# Patient Record
Sex: MALE | Race: OTHER | HISPANIC OR LATINO | Employment: FULL TIME | URBAN - METROPOLITAN AREA
[De-identification: names, ages, dates, MRNs, and addresses within clinical notes are randomized per-mention and may not be internally consistent; named-entity substitution may affect disease eponyms.]

---

## 2019-06-25 ENCOUNTER — OFFICE VISIT (OUTPATIENT)
Dept: URGENT CARE | Facility: CLINIC | Age: 44
End: 2019-06-25
Payer: COMMERCIAL

## 2019-06-25 VITALS
HEART RATE: 89 BPM | OXYGEN SATURATION: 99 % | BODY MASS INDEX: 29.49 KG/M2 | RESPIRATION RATE: 16 BRPM | HEIGHT: 70 IN | SYSTOLIC BLOOD PRESSURE: 136 MMHG | WEIGHT: 206 LBS | DIASTOLIC BLOOD PRESSURE: 80 MMHG | TEMPERATURE: 97.8 F

## 2019-06-25 DIAGNOSIS — M54.32 LEFT SIDED SCIATICA: ICD-10-CM

## 2019-06-25 PROCEDURE — 99203 OFFICE O/P NEW LOW 30 MIN: CPT | Performed by: NURSE PRACTITIONER

## 2019-06-25 RX ORDER — IBUPROFEN 600 MG/1
600 TABLET ORAL EVERY 6 HOURS PRN
COMMUNITY
End: 2024-03-17

## 2019-06-25 RX ORDER — CYCLOBENZAPRINE HCL 5 MG
5-10 TABLET ORAL 3 TIMES DAILY PRN
Qty: 30 TAB | Refills: 0 | Status: SHIPPED | OUTPATIENT
Start: 2019-06-25 | End: 2020-10-14

## 2019-06-25 RX ORDER — KETOROLAC TROMETHAMINE 30 MG/ML
30 INJECTION, SOLUTION INTRAMUSCULAR; INTRAVENOUS ONCE
Status: COMPLETED | OUTPATIENT
Start: 2019-06-25 | End: 2019-06-25

## 2019-06-25 RX ADMIN — KETOROLAC TROMETHAMINE 30 MG: 30 INJECTION, SOLUTION INTRAMUSCULAR; INTRAVENOUS at 11:20

## 2019-06-25 ASSESSMENT — ENCOUNTER SYMPTOMS
BACK PAIN: 1
VOMITING: 0
COUGH: 0
SENSORY CHANGE: 0
WEAKNESS: 0
FOCAL WEAKNESS: 0
TINGLING: 0
CHILLS: 0
ORTHOPNEA: 0
SHORTNESS OF BREATH: 0
FEVER: 0
NAUSEA: 0

## 2019-06-25 NOTE — PROGRESS NOTES
"Subjective:      Teja Michel is a 44 y.o. male who presents with Back Pain (x4 days sciatica nerve pain, (R) side)            HPI New. 44 year old male with left sided sciatica for 4 days. States pain in moderate. Denies bowel or bladder issues, nausea, abdominal pain or pelvic paresthesia. He reports that this pain radiates down to his knee. Not a new issue for him. Has been taking ibuprofen sporadically.  Aspirin  No current outpatient prescriptions on file prior to visit.     No current facility-administered medications on file prior to visit.      Social History     Social History   • Marital status: Single     Spouse name: N/A   • Number of children: N/A   • Years of education: N/A     Occupational History   • Not on file.     Social History Main Topics   • Smoking status: Never Smoker   • Smokeless tobacco: Never Used   • Alcohol use No   • Drug use: No   • Sexual activity: Not on file     Other Topics Concern   • Not on file     Social History Narrative   • No narrative on file     family history is not on file.      Review of Systems   Constitutional: Negative for chills and fever.   Respiratory: Negative for cough and shortness of breath.    Cardiovascular: Negative for chest pain and orthopnea.   Gastrointestinal: Negative for nausea and vomiting.   Genitourinary: Negative for dysuria.   Musculoskeletal: Positive for back pain.   Neurological: Negative for tingling, sensory change, focal weakness and weakness.          Objective:     /80   Pulse 89   Temp 36.6 °C (97.8 °F) (Temporal)   Resp 16   Ht 1.778 m (5' 10\")   Wt 93.4 kg (206 lb)   SpO2 99%   BMI 29.56 kg/m²      Physical Exam   Constitutional: He appears well-developed and well-nourished. No distress.   Cardiovascular: Normal rate, regular rhythm and normal heart sounds.    No murmur heard.  Pulmonary/Chest: Effort normal and breath sounds normal.   Musculoskeletal:        Lumbar back: He exhibits decreased range of motion, " tenderness, pain and spasm. He exhibits no swelling.   Neurological: He is alert. He exhibits normal muscle tone. Gait normal.   Reflex Scores:       Patellar reflexes are 2+ on the right side and 2+ on the left side.              Assessment/Plan:     1. Left sided sciatica  cyclobenzaprine (FLEXERIL) 5 MG tablet    REFERRAL TO PHYSIATRY (PMR)    ketorolac (TORADOL) injection 30 mg     toradol here in clinic.  Ibuprofen every 8 hours with food.  Flexeril with sedation precautions given.  Gentle stretching.  Differential diagnosis, natural history, supportive care, and indications for immediate follow-up discussed at length.

## 2020-09-11 ENCOUNTER — OFFICE VISIT (OUTPATIENT)
Dept: URGENT CARE | Facility: CLINIC | Age: 45
End: 2020-09-11
Payer: COMMERCIAL

## 2020-09-11 VITALS
WEIGHT: 234 LBS | HEART RATE: 90 BPM | OXYGEN SATURATION: 95 % | SYSTOLIC BLOOD PRESSURE: 124 MMHG | RESPIRATION RATE: 14 BRPM | TEMPERATURE: 98.2 F | DIASTOLIC BLOOD PRESSURE: 76 MMHG | BODY MASS INDEX: 33.5 KG/M2 | HEIGHT: 70 IN

## 2020-09-11 DIAGNOSIS — L03.032 PARONYCHIA OF GREAT TOE OF LEFT FOOT: ICD-10-CM

## 2020-09-11 PROCEDURE — 99214 OFFICE O/P EST MOD 30 MIN: CPT | Performed by: FAMILY MEDICINE

## 2020-09-11 RX ORDER — SULFAMETHOXAZOLE AND TRIMETHOPRIM 800; 160 MG/1; MG/1
1 TABLET ORAL 2 TIMES DAILY
Qty: 14 TAB | Refills: 0 | Status: SHIPPED | OUTPATIENT
Start: 2020-09-11 | End: 2020-09-18

## 2020-09-11 ASSESSMENT — ENCOUNTER SYMPTOMS
SHORTNESS OF BREATH: 0
FEVER: 0
FOCAL WEAKNESS: 0
SORE THROAT: 0
TINGLING: 1
COUGH: 0
VOMITING: 0

## 2020-09-11 NOTE — PROGRESS NOTES
"Subjective:     Teja Michel is a 45 y.o. male who presents for Foot Problem ( (L) big toe pain )    HPI  Pt presents for evaluation of a new problem   Pt with pain in left great toe   Pt with left great toe pain on and off for the past few weeks  Has had swelling and redness around the toenail  Sometimes painful to walk  Exacerbated by tight shoes  Has not had any drainage  Has not tried any treatment other than trying to stay off his feet    Review of Systems   Constitutional: Negative for fever.   HENT: Negative for sore throat.    Respiratory: Negative for cough and shortness of breath.    Cardiovascular: Negative for chest pain.   Gastrointestinal: Negative for vomiting.   Skin: Positive for rash.   Neurological: Positive for tingling (Chronic ). Negative for focal weakness.     PMH: History of left-sided sciatica causing chronic numbness in left foot  MEDS:   Current Outpatient Medications:   •  ibuprofen (MOTRIN) 600 MG Tab, Take 600 mg by mouth every 6 hours as needed., Disp: , Rfl:   •  cyclobenzaprine (FLEXERIL) 5 MG tablet, Take 1-2 Tabs by mouth 3 times a day as needed for Moderate Pain., Disp: 30 Tab, Rfl: 0  ALLERGIES:   Allergies   Allergen Reactions   • Aspirin      Facial swelling     SURGHX: History reviewed. No pertinent surgical history.  SOCHX:  reports that he has never smoked. He has never used smokeless tobacco. He reports that he does not drink alcohol or use drugs.  FH: Family history was reviewed, not contributing to acute infection     Objective:   /76 (BP Location: Left arm, Patient Position: Sitting, BP Cuff Size: Adult)   Pulse 90   Temp 36.8 °C (98.2 °F) (Temporal)   Resp 14   Ht 1.778 m (5' 10\")   Wt 106.1 kg (234 lb)   SpO2 95%   BMI 33.58 kg/m²     Physical Exam  Constitutional:       General: He is not in acute distress.     Appearance: He is well-developed. He is not diaphoretic.   HENT:      Head: Normocephalic and atraumatic.   Pulmonary:      Effort: " Pulmonary effort is normal.   Musculoskeletal:      Comments: Full range of motion of left great toe   Skin:     General: Skin is warm and dry.      Comments: Mild erythema and swelling at the base of left great toe nail, no drainable abscess appreciated, no active drainage   Neurological:      Mental Status: He is alert and oriented to person, place, and time.   Psychiatric:         Behavior: Behavior normal.         Thought Content: Thought content normal.         Judgment: Judgment normal.       Assessment/Plan:   Assessment    1. Paronychia of great toe of left foot  - sulfamethoxazole-trimethoprim (BACTRIM DS) 800-160 MG tablet; Take 1 Tab by mouth 2 times a day for 7 days.  Dispense: 14 Tab; Refill: 0    Patient with paronychia.  Based on his pictures from his phone, has actually improved a little over the past 1 week.  Will treat with course of antibiotics and follow-up if not fully resolved at that time.  Retains near normal sensation, and degree of tingling/sensory loss he is stated to be chronic, per patient.  No further work-up or follow-up needed as long as antibiotics fully resolving his symptoms.

## 2020-10-02 ENCOUNTER — NURSE TRIAGE (OUTPATIENT)
Dept: HEALTH INFORMATION MANAGEMENT | Facility: OTHER | Age: 45
End: 2020-10-02

## 2020-10-02 NOTE — TELEPHONE ENCOUNTER
"Teja.    Left foot with numbness.  Patient has been seen by [facility] and was told he had an infection and ingrown toenail.  Patient was given antibiotics.  Foot is progressively getting number and more dry and feels like \"leather\".  He is also stating both feet are really sore when he gets out of bed or stands from sitting.    PAIN AND NUMBNESS IS PROGRESSING IN FEET BILATERALLY.   Reason for Disposition  • Numbness or tingling on both sides of body and is a new symptom lasting > 24 hours    Additional Information  • Negative: Difficult to awaken or acting confused (e.g., disoriented, slurred speech)  • Negative: New neurologic deficit that is present NOW, sudden onset of ANY of the following: * Weakness of the face, arm, or leg on one side of the body * Numbness of the face, arm, or leg on one side of the body * Loss of speech or garbled speech  • Negative: Sounds like a life-threatening emergency to the triager  • Negative: Confusion, disorientation, or hallucinations is the main symptom  • Negative: Dizziness is the main symptom  • Negative: Followed a head injury within last 3 days  • Negative: Headache (with neurologic deficit)  • Negative: Unable to urinate (or only a few drops) and bladder feels very full  • Negative: Loss of control of bowel or bladder (i.e., incontinence) of new onset  • Negative: Back pain with numbness (loss of sensation) in groin or rectal area  • Negative: Patient sounds very sick or weak to the triager  • Negative: Neurologic deficit that was brief (now gone), ANY of the following: * Weakness of the face, arm, or leg on one side of the body * Numbness of the face, arm, or leg on one side of the body * Loss of speech or garbled speech  • Negative: Neurologic deficit of gradual onset, ANY of the following: * Weakness of the face, arm, or leg on one side of the body * Numbness of the face, arm, or leg on one side of the body * Loss of speech or garbled speech  • Negative: [place] " "palsy suspected (i.e., weakness only one side of the face, developing over hours to days, no other symptoms)  • Negative: Tingling (e.g., pins and needles) of the face, arm or leg on one side of the body, that is  present now  • Negative: Patient wants to be seen  • Negative: Back pain (with neurologic deficit)  • Negative: Neck pain (with neurologic deficit)  • Negative: Loss of speech or garbled speech is a chronic symptom (recurrent or ongoing problem lasting > 4 weeks)  • Negative: Weakness of arm or leg is a chronic symptom (recurrent or ongoing problem lasting > 4 weeks)  • Negative: Numbness or tingling in one or both hands is a chronic symptom (recurrent or ongoing problem lasting > 4 weeks)  • Negative: Numbness or tingling in one or both feet is a chronic symptom (recurrent or ongoing problem lasting > 4 weeks)    Answer Assessment - Initial Assessment Questions  1. SYMPTOM: \"What is the main symptom you are concerned about?\" (e.g., weakness, numbness)      Numbness in left foot and pain in bilateral feet  2. ONSET: \"When did this start?\" (minutes, hours, days; while sleeping)      Over a year ago with nerve damage on LEFT foot and great toe  3. LAST NORMAL: \"When was the last time you were normal (no symptoms)?\"      Over a year ago  4. PATTERN \"Does this come and go, or has it been constant since it started?\"  \"Is it present now?\"      Constant and progressing  5. CARDIAC SYMPTOMS: \"Have you had any of the following symptoms: chest pain, difficulty breathing, palpitations?\"      no  6. NEUROLOGIC SYMPTOMS: \"Have you had any of the following symptoms: headache, dizziness, vision loss, double vision, changes in speech, unsteady on your feet?\"  no  7. OTHER SYMPTOMS: \"Do you have any other symptoms?\"      no  8. PREGNANCY: \"Is there any chance you are pregnant?\" \"When was your last menstrual period?\"      no    Protocols used: NEUROLOGIC DEFICIT-A-[place]      "

## 2020-10-14 ENCOUNTER — OFFICE VISIT (OUTPATIENT)
Dept: MEDICAL GROUP | Facility: MEDICAL CENTER | Age: 45
End: 2020-10-14
Payer: COMMERCIAL

## 2020-10-14 VITALS
BODY MASS INDEX: 34.15 KG/M2 | WEIGHT: 238.54 LBS | TEMPERATURE: 97.8 F | OXYGEN SATURATION: 94 % | SYSTOLIC BLOOD PRESSURE: 140 MMHG | HEART RATE: 94 BPM | DIASTOLIC BLOOD PRESSURE: 88 MMHG | HEIGHT: 70 IN

## 2020-10-14 DIAGNOSIS — Z87.11 HISTORY OF PEPTIC ULCER: ICD-10-CM

## 2020-10-14 DIAGNOSIS — R13.10 DYSPHAGIA, UNSPECIFIED TYPE: ICD-10-CM

## 2020-10-14 DIAGNOSIS — E78.5 DYSLIPIDEMIA: ICD-10-CM

## 2020-10-14 DIAGNOSIS — Z76.89 ENCOUNTER TO ESTABLISH CARE: ICD-10-CM

## 2020-10-14 DIAGNOSIS — R74.01 TRANSAMINITIS: ICD-10-CM

## 2020-10-14 DIAGNOSIS — R20.0 NUMBNESS: ICD-10-CM

## 2020-10-14 DIAGNOSIS — R03.0 BLOOD PRESSURE ELEVATED WITHOUT HISTORY OF HTN: ICD-10-CM

## 2020-10-14 DIAGNOSIS — K21.9 GASTROESOPHAGEAL REFLUX DISEASE, UNSPECIFIED WHETHER ESOPHAGITIS PRESENT: ICD-10-CM

## 2020-10-14 DIAGNOSIS — E66.9 OBESITY (BMI 30-39.9): ICD-10-CM

## 2020-10-14 PROCEDURE — 99214 OFFICE O/P EST MOD 30 MIN: CPT | Performed by: NURSE PRACTITIONER

## 2020-10-14 ASSESSMENT — PATIENT HEALTH QUESTIONNAIRE - PHQ9: CLINICAL INTERPRETATION OF PHQ2 SCORE: 0

## 2020-10-14 NOTE — PROGRESS NOTES
CC: Establish care/numbness in feet, history of elevated liver enzymes      Teja Michel is a 45 y.o. male here to establish care and to discuss the evaluation and management of:    Patient here today to establish care and discuss numbness in feet, history of elevated enzymes    Former PCP in [place] -has not followed up in over three years      Left foot numbness  Intermittent sensation of numbness on the bottom of his foot.  This is primary in his heel.  States it feels like a thick layer of numbness sometimes.  Has noticed some tingling and pinching sensations however it is intermittent in nature.  Does mention he has had change of footwear.  Noticed an improvement when he returned to previous footwear.  I discussed is likely that the shoes causing his symptoms that they may be too tight.  Recommend getting wide shoes.       History of elevated Liver enzymes  States he was told he has elevated liver enzymes however he has never followed up with this.  Very vague about his previous medical history.      Cholesterol   History of elevated cholesterol. Not so interested in statins. Was on them in the past however did not want to continue taking them.    Dysphagia  Has a hard time swallowing pills. Has had this for a long time.  States he has to have everything in liquid form.     GERD  Chronic. Not a daily problem. Not taking anything daily for this. Takes Vinegar for this as needed and states it is helpful. Remote history of having a ulcer.         ROS:  Denies any Headache, Blurred Vision, Confusion, Chest pain,  Shortness of breath,  Abdominal pain, Changes of bowel or bladder, Hematuria, Hematochezia, Lower ext. edema, Fevers, Nights sweats, Weight Changes, Focal weakness or numbness.  And all other systems reviewed and all are negative. +numbness on bottom of foot, GERD      Current Outpatient Medications:   •  ibuprofen (MOTRIN) 600 MG Tab, Take 600 mg by mouth every 6 hours as needed., Disp: ,  Rfl:     Allergies   Allergen Reactions   • Aspirin      Facial swelling       Past Medical History:   Diagnosis Date   • GERD (gastroesophageal reflux disease)    • Hyperlipidemia      Past Surgical History:   Procedure Laterality Date   • APPENDECTOMY     • HERNIA REPAIR Right     inguinal     Family History   Problem Relation Age of Onset   • Thyroid Mother    • Hyperlipidemia Father    • Heart Attack Father    • Heart Disease Father    • Hypertension Father    • No Known Problems Sister    • Diabetes Neg Hx      Social History     Socioeconomic History   • Marital status: Single     Spouse name: Not on file   • Number of children: Not on file   • Years of education: Not on file   • Highest education level: Not on file   Occupational History   • Not on file   Social Needs   • Financial resource strain: Not on file   • Food insecurity     Worry: Not on file     Inability: Not on file   • Transportation needs     Medical: Not on file     Non-medical: Not on file   Tobacco Use   • Smoking status: Former Smoker     Quit date: 10/14/2010     Years since quitting: 10.0   • Smokeless tobacco: Never Used   Substance and Sexual Activity   • Alcohol use: No   • Drug use: No   • Sexual activity: Not Currently     Partners: Female   Lifestyle   • Physical activity     Days per week: Not on file     Minutes per session: Not on file   • Stress: Not on file   Relationships   • Social connections     Talks on phone: Not on file     Gets together: Not on file     Attends Yarsanism service: Not on file     Active member of club or organization: Not on file     Attends meetings of clubs or organizations: Not on file     Relationship status: Not on file   • Intimate partner violence     Fear of current or ex partner: Not on file     Emotionally abused: Not on file     Physically abused: Not on file     Forced sexual activity: Not on file   Other Topics Concern   • Not on file   Social History Narrative   • Not on file  "      Objective:     Vitals: /88 (BP Location: Right arm, Patient Position: Sitting, BP Cuff Size: Adult)   Pulse 94   Temp 36.6 °C (97.8 °F) (Temporal)   Ht 1.778 m (5' 10\") Comment: Pt reported,didn't want to take his shoes off to take height  Wt 108.2 kg (238 lb 8.6 oz)   SpO2 94%   BMI 34.23 kg/m²      General: Alert, pleasant, NAD  HEENT:  Normocephalic.  Neck supple.  No thyromegaly or masses palpated. No cervical or supraclavicular lymphadenopathy.  Heart:  Regular rate and rhythm.  S1 and S2 normal.  No murmurs appreciated.    Respiratory:  Normal respiratory effort.  Clear to auscultation bilaterally.    Skin:  Warm, dry, no rashes  Musculoskeletal:  Gait is normal.  Moves all extremities well.  Extremities:   No leg edema.  Neurological: No tremors  Psych:  Affect/mood is normal, judgement is good, memory is intact, grooming is appropriate.      Assessment and Plan.     45 y.o. male to establish care and discuss the followin. Blood pressure elevated without history of HTN  New to me.  140/88 in clinic.  No chest pain, shortness of breath or dizziness.  Has gained weight over the last year.  Recommend weight loss, DASH diet, exercise.  Follow-up labs.  Have discussed starting medication for this.  - Comp Metabolic Panel; Future  - MICROALBUMIN CREAT RATIO URINE; Future  - TSH; Future    2. Dyslipidemia  New to me.  Have ordered labs.  - Lipid Profile; Future    3. Gastroesophageal reflux disease, unspecified whether esophagitis present  New problem to me.  States his symptoms are intermittent and improved with drinking vinegar.  Recommend follow-up with GI.  Dietary modifications to avoid trigger foods, weight loss.  Recommend as needed Tums or Pepcid if symptoms worsen.    4. History of peptic ulcer  Remote history.  Denies any significant abdominal pain, epigastric discomfort, blood or black tarry stools or hematemesis at this time.  Recommend avoiding NSAIDs.      5. Dysphagia, " unspecified type  New problem to me.  Reports long history of having difficulty swallowing pills.  Recommend follow-up with gastroenterology.    6. Obesity (BMI 30-39.9)  Chronic. Patient encouraged to reduce excess calorie consumption. Encouraged regular exercise. Discussed long term sequelae of obesity.  - Patient identified as having weight management issue.  Appropriate orders and counseling given.    7. Numbness  New problem.  Suspect it is due to improper fitting footwear based on patient's work.  Recommend wide fitting shoes, weight loss.  - VITAMIN B12; Future    8. Transaminitis  Reports having elevated liver enzymes in the past.  Labs ordered.  - CBC WITHOUT DIFFERENTIAL; Future  - Comp Metabolic Panel; Future  - GAMMA GT (GGT); Future  - HEPATITIS PANEL ACUTE(4 COMPONENTS); Future    9. Encounter to establish care        Return for Long (40 min), Lab results.          Tari NATARAJAN.

## 2020-10-20 LAB
ALBUMIN SERPL-MCNC: 4.9 G/DL (ref 4–5)
ALBUMIN/CREAT UR: 32 MG/G CREAT (ref 0–29)
ALBUMIN/GLOB SERPL: 1.6 {RATIO} (ref 1.2–2.2)
ALP SERPL-CCNC: 95 IU/L (ref 39–117)
ALT SERPL-CCNC: 195 IU/L (ref 0–44)
AST SERPL-CCNC: 85 IU/L (ref 0–40)
BASOPHILS # BLD AUTO: 0.1 X10E3/UL (ref 0–0.2)
BASOPHILS NFR BLD AUTO: 1 %
BILIRUB SERPL-MCNC: 0.7 MG/DL (ref 0–1.2)
BUN SERPL-MCNC: 16 MG/DL (ref 6–24)
BUN/CREAT SERPL: 14 (ref 9–20)
CALCIUM SERPL-MCNC: 10.2 MG/DL (ref 8.7–10.2)
CHLORIDE SERPL-SCNC: 99 MMOL/L (ref 96–106)
CHOLEST SERPL-MCNC: 223 MG/DL (ref 100–199)
CO2 SERPL-SCNC: 21 MMOL/L (ref 20–29)
CREAT SERPL-MCNC: 1.12 MG/DL (ref 0.76–1.27)
CREAT UR-MCNC: 252.4 MG/DL
EOSINOPHIL # BLD AUTO: 0.1 X10E3/UL (ref 0–0.4)
EOSINOPHIL NFR BLD AUTO: 2 %
ERYTHROCYTE [DISTWIDTH] IN BLOOD BY AUTOMATED COUNT: 12.6 % (ref 11.6–15.4)
GGT SERPL-CCNC: 72 IU/L (ref 0–65)
GLOBULIN SER CALC-MCNC: 3 G/DL (ref 1.5–4.5)
GLUCOSE SERPL-MCNC: 111 MG/DL (ref 65–99)
HAV IGM SERPL QL IA: NEGATIVE
HBV CORE IGM SERPL QL IA: NEGATIVE
HBV SURFACE AG SERPL QL IA: NEGATIVE
HCT VFR BLD AUTO: 52.9 % (ref 37.5–51)
HCV AB S/CO SERPL IA: <0.1 S/CO RATIO (ref 0–0.9)
HDLC SERPL-MCNC: 38 MG/DL
HGB BLD-MCNC: 17.9 G/DL (ref 13–17.7)
IMM GRANULOCYTES # BLD AUTO: 0.1 X10E3/UL (ref 0–0.1)
IMM GRANULOCYTES NFR BLD AUTO: 1 %
IMMATURE CELLS  115398: ABNORMAL
LABORATORY COMMENT REPORT: ABNORMAL
LDLC SERPL CALC-MCNC: 115 MG/DL (ref 0–99)
LYMPHOCYTES # BLD AUTO: 2.6 X10E3/UL (ref 0.7–3.1)
LYMPHOCYTES NFR BLD AUTO: 28 %
MCH RBC QN AUTO: 30.7 PG (ref 26.6–33)
MCHC RBC AUTO-ENTMCNC: 33.8 G/DL (ref 31.5–35.7)
MCV RBC AUTO: 91 FL (ref 79–97)
MICROALBUMIN UR-MCNC: 81.8 UG/ML
MONOCYTES # BLD AUTO: 1 X10E3/UL (ref 0.1–0.9)
MONOCYTES NFR BLD AUTO: 11 %
MORPHOLOGY BLD-IMP: ABNORMAL
NEUTROPHILS # BLD AUTO: 5.3 X10E3/UL (ref 1.4–7)
NEUTROPHILS NFR BLD AUTO: 57 %
NRBC BLD AUTO-RTO: ABNORMAL %
PLATELET # BLD AUTO: 243 X10E3/UL (ref 150–450)
POTASSIUM SERPL-SCNC: 4.6 MMOL/L (ref 3.5–5.2)
PROT SERPL-MCNC: 7.9 G/DL (ref 6–8.5)
RBC # BLD AUTO: 5.84 X10E6/UL (ref 4.14–5.8)
SODIUM SERPL-SCNC: 138 MMOL/L (ref 134–144)
TRIGL SERPL-MCNC: 399 MG/DL (ref 0–149)
TSH SERPL DL<=0.005 MIU/L-ACNC: 3.74 UIU/ML (ref 0.45–4.5)
VIT B12 SERPL-MCNC: 849 PG/ML (ref 232–1245)
VLDLC SERPL CALC-MCNC: 70 MG/DL (ref 5–40)
WBC # BLD AUTO: 9.1 X10E3/UL (ref 3.4–10.8)

## 2020-10-28 ENCOUNTER — OFFICE VISIT (OUTPATIENT)
Dept: MEDICAL GROUP | Facility: MEDICAL CENTER | Age: 45
End: 2020-10-28
Payer: COMMERCIAL

## 2020-10-28 VITALS
DIASTOLIC BLOOD PRESSURE: 78 MMHG | OXYGEN SATURATION: 95 % | HEART RATE: 90 BPM | BODY MASS INDEX: 34.07 KG/M2 | TEMPERATURE: 97.7 F | HEIGHT: 70 IN | SYSTOLIC BLOOD PRESSURE: 134 MMHG | WEIGHT: 238 LBS

## 2020-10-28 DIAGNOSIS — R80.9 MICROALBUMINURIA: ICD-10-CM

## 2020-10-28 DIAGNOSIS — R73.01 IFG (IMPAIRED FASTING GLUCOSE): ICD-10-CM

## 2020-10-28 DIAGNOSIS — R74.01 TRANSAMINITIS: ICD-10-CM

## 2020-10-28 DIAGNOSIS — L60.0 INGROWN NAIL: ICD-10-CM

## 2020-10-28 DIAGNOSIS — M79.672 PAIN OF LEFT HEEL: ICD-10-CM

## 2020-10-28 DIAGNOSIS — E78.5 DYSLIPIDEMIA: ICD-10-CM

## 2020-10-28 LAB
HBA1C MFR BLD: 5.6 % (ref 0–5.6)
INT CON NEG: NEGATIVE
INT CON POS: POSITIVE

## 2020-10-28 PROCEDURE — 83036 HEMOGLOBIN GLYCOSYLATED A1C: CPT | Performed by: NURSE PRACTITIONER

## 2020-10-28 PROCEDURE — 99214 OFFICE O/P EST MOD 30 MIN: CPT | Performed by: NURSE PRACTITIONER

## 2020-10-28 ASSESSMENT — FIBROSIS 4 INDEX: FIB4 SCORE: 1.15

## 2020-10-28 NOTE — PROGRESS NOTES
Chief Complaint   Patient presents with   • Lab Results     DOS: 10/18/2020       Subjective:     HPI:     Teja Michel is a 45 y.o. male here to discuss the evaluation and management of:    Presents today to review his most recent labs.    1. Dyslipidemia  Have reviewed most recent labs with patient.  Continues to decline statin therapy despite his results.  Does endorse a poor diet.  Sedentary.    10/19/2020   Ref. Range 10/19/2020 05:31   Cholesterol,Tot Latest Ref Range: 100 - 199 mg/dL 223 (H)   Triglycerides Latest Ref Range: 0 - 149 mg/dL 399 (H)   HDL Latest Ref Range: >39 mg/dL 38 (L)   LDL Chol Calc (NIH) Latest Ref Range: 0 - 99 mg/dL 115 (H)   VLDL Cholesterol Calc Latest Ref Range: 5 - 40 mg/dL 70 (H)     2. Transaminitis  Most recent lab work with elevation.  Denies excessive alcohol consumption.  Over-the-counter medications that he has been taking would be Turmeric on and off however states that he has had a history of elevated liver enzymes prior to even starting turmeric.     10/19/2020   Ref. Range 10/19/2020 05:31   AST(SGOT) Latest Ref Range: 0 - 40 IU/L 85 (H)   ALT(SGPT) Latest Ref Range: 0 - 44 IU/L 195 (H)      Ref. Range 10/19/2020 05:31   Gamma Gt Latest Ref Range: 0 - 65 IU/L 72 (H)         3. IFG (impaired fasting glucose)  Found on previous labs.  Have checked A1c in the clinic today 5.6%.  Have discussed this with the patient in detail.  Recommend weight loss, dietary management and exercise.    4. Microalbuminuria  Present on most recent lab work.  Have discussed this with the patient.    5. Ingrown nail  Left great toe with slight hyperemia around his nail fold, sensitivity.  No purulence or fluctuance or presence of infection at this time however has had previous infection that he did not seek medical attention for.    6. Pain of left heel  Continues to complain of pain in his heel.  There is no obvious signs of deformity, injury, infection.  Reports having history of  sciatica problems however previously stated that he did have some improvement with changing of his footwear.  Does work 13-hour shifts on his feet.      ROS:  Denies any Headache, Blurred Vision, Confusion, Chest pain,  Shortness of breath,  Abdominal pain, Changes of bowel or bladder, Lower ext edema, Fevers, Nights sweats, Weight Changes, Focal weakness or numbness.  And all other systems reviewed and are all negative. POSITIVE FOR heel pain, ingrown nail        Current Outpatient Medications:   •  ibuprofen (MOTRIN) 600 MG Tab, Take 600 mg by mouth every 6 hours as needed., Disp: , Rfl:     Allergies   Allergen Reactions   • Aspirin      Facial swelling       Past Medical History:   Diagnosis Date   • GERD (gastroesophageal reflux disease)    • Hyperlipidemia      Past Surgical History:   Procedure Laterality Date   • APPENDECTOMY     • HERNIA REPAIR Right     inguinal     Family History   Problem Relation Age of Onset   • Thyroid Mother    • Hyperlipidemia Father    • Heart Attack Father    • Heart Disease Father    • Hypertension Father    • No Known Problems Sister    • Diabetes Neg Hx      Social History     Socioeconomic History   • Marital status: Single     Spouse name: Not on file   • Number of children: Not on file   • Years of education: Not on file   • Highest education level: Not on file   Occupational History   • Not on file   Social Needs   • Financial resource strain: Not on file   • Food insecurity     Worry: Not on file     Inability: Not on file   • Transportation needs     Medical: Not on file     Non-medical: Not on file   Tobacco Use   • Smoking status: Former Smoker     Quit date: 10/14/2010     Years since quitting: 10.0   • Smokeless tobacco: Never Used   Substance and Sexual Activity   • Alcohol use: No   • Drug use: No   • Sexual activity: Not Currently     Partners: Female   Lifestyle   • Physical activity     Days per week: Not on file     Minutes per session: Not on file   • Stress:  "Not on file   Relationships   • Social connections     Talks on phone: Not on file     Gets together: Not on file     Attends Ep[employer] service: Not on file     Active member of club or organization: Not on file     Attends meetings of clubs or organizations: Not on file     Relationship status: Not on file   • Intimate partner violence     Fear of current or ex partner: Not on file     Emotionally abused: Not on file     Physically abused: Not on file     Forced sexual activity: Not on file   Other Topics Concern   • Not on file   Social History Narrative   • Not on file       Objective:     Vitals: /78 (BP Location: Right arm, Patient Position: Sitting, BP Cuff Size: Adult)   Pulse 90   Temp 36.5 °C (97.7 °F) (Temporal)   Ht 1.778 m (5' 10\") Comment: Pt reported  Wt 108 kg (238 lb)   SpO2 95%   BMI 34.15 kg/m²    General: Alert, pleasant, NAD  HEENT: Normocephalic.    Skin: Warm, dry, no rashes.  Right great toenail with erythema around nailbed.  Extremities: No leg edema. No discoloration  Neurological: No tremors  Psych:  Affect/mood is normal, judgement is good, memory is intact, grooming is appropriate.    Assessment/Plan:     Teja was seen today for lab results.    Diagnoses and all orders for this visit:    Dyslipidemia  Not well controlled.  Continues to decline statins.  Recommend aggressive dietary changes, exercise and weight loss.  Patient states he would like to try this before any further aggressive treatment such as medications.  Repeat in 4 to 6 months.  -     Lipid Profile; Future    Transaminitis  Chronic prone for the patient.  Most recent labs with elevation.  Has not had ultrasound.  Referral to gastroenterology for further work-up.  -     REFERRAL TO GASTROENTEROLOGY    IFG (impaired fasting glucose)  A1c in clinic today 5.6%.  Have cautioned patient regarding prediabetes.  Continue to monitor.  -     POCT  A1C  -     HEMOGLOBIN A1C; Future    Microalbuminuria  Present in " the clinic today.  Continue to routinely monitor.  Work on weight loss, Dash diet.  A1c 5.6%.    Ingrown nail  -     REFERRAL TO PODIATRY    Pain of left heel  Persistent discomfort.  Had slight improvement with change of footwear.  Referral placed to podiatry for further recommendations and work-up.  Have discussed making sure he has proper footwear-especially because he does work long shifts on his feet, weight loss would be helpful.  -     REFERRAL TO PODIATRY      No follow-ups on file.    {I have placed the above orders and discussed them with an approved delegating provider.  The MA is performing the below orders under the direction of Dr. Skyler STEWART

## 2020-10-28 NOTE — PATIENT INSTRUCTIONS
High Triglycerides Eating Plan  Triglycerides are a type of fat in the blood. High levels of triglycerides can increase your risk of heart disease and stroke. If your triglyceride levels are high, choosing the right foods can help lower your triglycerides and keep your heart healthy. Work with your health care provider or a diet and nutrition specialist (dietitian) to develop an eating plan that is right for you.  What are tips for following this plan?  General guidelines    · Lose weight, if you are overweight. For most people, losing 5-10 lbs (2-5 kg) helps lower triglyceride levels. A weight-loss plan may include.  ? 30 minutes of exercise at least 5 days a week.  ? Reducing the amount of calories, sugar, and fat you eat.  · Eat a wide variety of fresh fruits, vegetables, and whole grains. These foods are high in fiber.  · Eat foods that contain healthy fats, such as fatty fish, nuts, seeds, and olive oil.  · Avoid foods that are high in added sugar, added salt (sodium), saturated fat, and trans fat.  · Avoid low-fiber, refined carbohydrates such as white bread, crackers, noodles, and white rice.  · Avoid foods with partially hydrogenated oils (trans fats), such as fried foods or stick margarine.  · Limit alcohol intake to no more than 1 drink a day for nonpregnant women and 2 drinks a day for men. One drink equals 12 oz of beer, 5 oz of wine, or 1½ oz of hard liquor. Your health care provider may recommend that you drink less depending on your overall health.  Reading food labels  · Check food labels for the amount of saturated fat. Choose foods with no or very little saturated fat.  · Check food labels for the amount of trans fat. Choose foods with no trans fat.  · Check food labels for the amount of cholesterol. Choose foods low in cholesterol. Ask your dietitian how much cholesterol you should have each day.  · Check food labels for the amount of sodium. Choose foods with less than 140 milligrams (mg) per  serving.  Shopping  · Buy dairy products labeled as nonfat (skim) or low-fat (1%).  · Avoid buying processed or prepackaged foods. These are often high in added sugar, sodium, and fat.  Cooking  · Choose healthy fats when cooking, such as olive oil or canola oil.  · Cook foods using lower fat methods, such as baking, broiling, boiling, or grilling.  · Make your own sauces, dressings, and marinades when possible, instead of buying them. Store-bought sauces, dressings, and marinades are often high in sodium and sugar.  Meal planning  · Eat more home-cooked food and less restaurant, buffet, and fast food.  · Eat fatty fish at least 2 times each week. Examples of fatty fish include salmon, trout, mackerel, tuna, and herring.  · If you eat whole eggs, do not eat more than 3 egg yolks per week.  What foods are recommended?  The items listed may not be a complete list. Talk with your dietitian about what dietary choices are best for you.  Grains  Whole wheat or whole grain breads, crackers, cereals, and pasta. Unsweetened oatmeal. Bulgur. Barley. Quinoa. Brown rice. Whole wheat flour tortillas.  Vegetables  Fresh or frozen vegetables. Low-sodium canned vegetables.  Fruits  All fresh, canned (in natural juice), or frozen fruits.  Meats and other protein foods  Skinless chicken or turkey. Ground chicken or turkey. Lean cuts of pork, trimmed of fat. Fish and seafood, especially salmon, trout, and herring. Egg whites. Dried beans, peas, or lentils. Unsalted nuts or seeds. Unsalted canned beans. Natural peanut or almond butter.  Dairy  Low-fat dairy products. Skim or low-fat (1%) milk. Reduced fat (2%) and low-sodium cheese. Low-fat ricotta cheese. Low-fat cottage cheese. Plain, low-fat yogurt.  Fats and oils  Tub margarine without trans fats. Light or reduced-fat mayonnaise. Light or reduced-fat salad dressings. Avocado. Safflower, olive, sunflower, soybean, and canola oils.  What foods are not recommended?  The items listed  may not be a complete list. Talk with your dietitian about what dietary choices are best for you.  Grains  White bread. White (regular) pasta. White rice. Cornbread. Bagels. Pastries. Crackers that contain trans fat.  Vegetables  Creamed or fried vegetables. Vegetables in a cheese sauce.  Fruits  Sweetened dried fruit. Canned fruit in syrup. Fruit juice.  Meats and other protein foods  Fatty cuts of meat. Ribs. Chicken wings. [place]. Sausage. Bologna. Salami. Chitterlings. Fatback. Hot dogs. Bratwurst. Packaged lunch meats.  Dairy  Whole or reduced-fat (2%) milk. Half-and-half. Cream cheese. Full-fat or sweetened yogurt. Full-fat cheese. Nondairy creamers. Whipped toppings. Processed cheese or cheese spreads. Cheese curds.  Beverages  Alcohol. Sweetened drinks, such as soda, lemonade, fruit drinks, or punches.  Fats and oils  Butter. Stick margarine. Lard. Shortening. Ghee. [place] fat. Tropical oils, such as coconut, palm kernel, or palm oils.  Sweets and desserts  Corn syrup. Sugars. Honey. Molasses. Candy. Jam and jelly. Syrup. Sweetened cereals. Cookies. Pies. Cakes. Donuts. Muffins. Ice cream.  Condiments  Store-bought sauces, dressings, and marinades that are high in sugar, such as ketchup and barbecue sauce.  Summary  · High levels of triglycerides can increase the risk of heart disease and stroke. Choosing the right foods can help lower your triglycerides.  · Eat plenty of fresh fruits, vegetables, and whole grains. Choose low-fat dairy and lean meats. Eat fatty fish at least twice a week.  · Avoid processed and prepackaged foods with added sugar, sodium, saturated fat, and trans fat.  · If you need suggestions or have questions about what types of food are good for you, talk with your health care provider or a dietitian.  This information is not intended to replace advice given to you by your health care provider. Make sure you discuss any questions you have with your health care provider.  Document Released:  10/05/2005 Document Revised: 11/30/2018 Document Reviewed: 02/20/2018  "[employer] Patient Education © 2020 "[employer] Inc.  Triglycerides Test  Why am I having this test?  Triglycerides are a type of fat in the body. Having a high level of triglycerides can increase your risk for heart disease.  You may have this test as part of a routine physical exam. Health care providers recommend that adults have this test at least once every 5 years. If you have risk factors for heart disease or are being treated for high triglycerides, you may need to have this test more often.  What is being tested?  This test measures the amount of triglycerides in your blood. Triglycerides are naturally present in the body, and you also take in triglycerides by eating certain foods.  Triglycerides may be measured as part of a test called a lipid profile, which tests triglycerides and cholesterol levels.  What kind of sample is taken?         A blood sample is required for this test. It may be collected by inserting a needle into a blood vessel, or by pricking a fingertip with a small needle (finger stick).  How do I prepare for this test?  · Follow instructions from your health care provider about changing or stopping your regular medicines.  · Do not eat or drink anything except water starting 9-12 hours before your test, or as long as told by your health care provider.  · Do not drink alcohol starting at least 24 hours before your test.  · Follow any instructions from your health care provider about dietary restrictions before your test.  Tell a health care provider about:  · All medicines you are taking, including vitamins, herbs, eye drops, creams, and over-the-counter medicines.  · Any blood disorders you have.  · Any medical conditions you have.  How are the results reported?  Your test results will be reported as a value that indicates how many triglycerides are in your blood. This will be given as milligrams of triglycerides per  deciliter of blood (mg/dL).  Your health care provider will compare your results to normal values that were established after testing a large group of people (reference ranges). Reference ranges may vary among labs and hospitals. For this test, common reference ranges are:  · Adults:  ? Male:  mg/dL or 0.45-1.81 mmol/L (SI units).  ? Female:  mg/dL or 0.40-1.52 mmol/L (SI units).  · Teens 16-19 years old:  ? Male:  mg/dL.  ? Female:  mg/dL.  · Children 12-15 years old:  ? Male:  mg/dL.  ? Female:  mg/dL.  · Children 6-11 years old:  ? Male:  mg/dL.  ? Female:  mg/dL.  · Children 5 years or younger:  ? Male: 30-86 mg/dL.  ? Female: 32-99 mg/dL.  What do the results mean?  Results that are within the reference range are considered normal. This means that you have a normal amount of triglycerides in your blood.  Results that are higher than your reference range mean that there are too many triglycerides in your blood. This may mean that you:  · Have a higher risk of heart disease.  · Have certain diseases that cause high triglycerides, such as diabetes.  · Are taking certain medicines such as estrogens and oral contraceptives.  Results that are lower than your reference range mean that there are too few triglycerides in your blood. This may mean that you are not getting enough nutrients in your diet (malnutrition).  Talk with your health care provider about what your results mean.  Questions to ask your health care provider  Ask your health care provider, or the department that is doing the test:  · When will my results be ready?  · How will I get my results?  · What are my treatment options?  · What other tests do I need?  · What are my next steps?  Summary  · Triglycerides are a type of fat in the body. Having a high level of triglycerides can increase your risk for heart disease.  · You may have this test as part of a routine physical exam. Triglycerides may be  measured as part of a test called a lipid profile, which tests triglycerides and cholesterol.  · Talk with your health care provider about what your results mean.  This information is not intended to replace advice given to you by your health care provider. Make sure you discuss any questions you have with your health care provider.  Document Released: 01/20/2006 Document Revised: 03/19/2019 Document Reviewed: 09/18/2018  [employer] Patient Education © 2020 [employer]  Preventing Type 2 Diabetes Mellitus  Type 2 diabetes (type 2 diabetes mellitus) is a long-term (chronic) disease that affects blood sugar (glucose) levels. Normally, a hormone called insulin allows glucose to enter cells in the body. The cells use glucose for energy. In type 2 diabetes, one or both of these problems may be present:  · The body does not make enough insulin.  · The body does not respond properly to insulin that it makes (insulin resistance).  Insulin resistance or lack of insulin causes excess glucose to build up in the blood instead of going into cells. As a result, high blood glucose (hyperglycemia) develops, which can cause many complications. Being overweight or obese and having an inactive (sedentary) lifestyle can increase your risk for diabetes. Type 2 diabetes can be delayed or prevented by making certain nutrition and lifestyle changes.  What nutrition changes can be made?    · Eat healthy meals and snacks regularly. Keep a healthy snack with you for when you get hungry between meals, such as fruit or a handful of nuts.  · Eat lean meats and proteins that are low in saturated fats, such as chicken, fish, egg whites, and beans. Avoid processed meats.  · Eat plenty of fruits and vegetables and plenty of grains that have not been processed (whole grains). It is recommended that you eat:  ? 1½?2 cups of fruit every day.  ? 2½?3 cups of vegetables every day.  ? 6?8 oz of whole grains every day, such as oats, whole wheat, bulgur,  brown rice, quinoa, and millet.  · Eat low-fat dairy products, such as milk, yogurt, and cheese.  · Eat foods that contain healthy fats, such as nuts, avocado, olive oil, and canola oil.  · Drink water throughout the day. Avoid drinks that contain added sugar, such as soda or sweet tea.  · Follow instructions from your health care provider about specific eating or drinking restrictions.  · Control how much food you eat at a time (portion size).  ? Check food labels to find out the serving sizes of foods.  ? Use a kitchen scale to weigh amounts of foods.  · Saute or steam food instead of frying it. Cook with water or broth instead of oils or butter.  · Limit your intake of:  ? Salt (sodium). Have no more than 1 tsp (2,400 mg) of sodium a day. If you have heart disease or high blood pressure, have less than ½?¾ tsp (1,500 mg) of sodium a day.  ? Saturated fat. This is fat that is solid at room temperature, such as butter or fat on meat.  What lifestyle changes can be made?  Activity    · Do moderate-intensity physical activity for at least 30 minutes on at least 5 days of the week, or as much as told by your health care provider.  · Ask your health care provider what activities are safe for you. A mix of physical activities may be best, such as walking, swimming, cycling, and strength training.  · Try to add physical activity into your day. For example:  ? Park in spots that are farther away than usual, so that you walk more. For example, park in a far corner of the parking lot when you go to the office or the grocery store.  ? Take a walk during your lunch break.  ? Use stairs instead of elevators or escalators.  Weight Loss  · Lose weight as directed. Your health care provider can determine how much weight loss is best for you and can help you lose weight safely.  · If you are overweight or obese, you may be instructed to lose at least 5?7 % of your body weight.  Alcohol and Tobacco    · Limit alcohol intake to no  more than 1 drink a day for nonpregnant women and 2 drinks a day for men. One drink equals 12 oz of beer, 5 oz of wine, or 1½ oz of hard liquor.  · Do not use any tobacco products, such as cigarettes, chewing tobacco, and e-cigarettes. If you need help quitting, ask your health care provider.  Work With Your Health Care Provider  · Have your blood glucose tested regularly, as told by your health care provider.  · Discuss your risk factors and how you can reduce your risk for diabetes.  · Get screening tests as told by your health care provider. You may have screening tests regularly, especially if you have certain risk factors for type 2 diabetes.  · Make an appointment with a diet and nutrition specialist (registered dietitian). A registered dietitian can help you make a healthy eating plan and can help you understand portion sizes and food labels.  Why are these changes important?  · It is possible to prevent or delay type 2 diabetes and related health problems by making lifestyle and nutrition changes.  · It can be difficult to recognize signs of type 2 diabetes. The best way to avoid possible damage to your body is to take actions to prevent the disease before you develop symptoms.  What can happen if changes are not made?  · Your blood glucose levels may keep increasing. Having high blood glucose for a long time is dangerous. Too much glucose in your blood can damage your blood vessels, heart, kidneys, nerves, and eyes.  · You may develop prediabetes or type 2 diabetes. Type 2 diabetes can lead to many chronic health problems and complications, such as:  ? Heart disease.  ? Stroke.  ? Blindness.  ? Kidney disease.  ? Depression.  ? Poor circulation in the feet and legs, which could lead to surgical removal (amputation) in severe cases.  Where to find support  · Ask your health care provider to recommend a registered dietitian, diabetes educator, or weight loss program.  · Look for local or online weight loss  "groups.  · Join a gym, fitness club, or outdoor activity group, such as a walking club.  Where to find more information  To learn more about diabetes and diabetes prevention, visit:  · American Diabetes Association (ADA): www.diabetes.org  · National [place] of Diabetes and Digestive and Kidney Diseases: www.niddk.nih.gov/health-information/diabetes  To learn more about healthy eating, visit:  · The U.[place]. Department of Agriculture ([place]), Choose My Plate: www.[place].gov/food-groups  · Office of Disease Prevention and Health Promotion (ODPHP), Dietary Guidelines: www.health.gov/dietaryguidelines  Summary  · You can reduce your risk for type 2 diabetes by increasing your physical activity, eating healthy foods, and losing weight as directed.  · Talk with your health care provider about your risk for type 2 diabetes. Ask about any blood tests or screening tests that you need to have.  This information is not intended to replace advice given to you by your health care provider. Make sure you discuss any questions you have with your health care provider.  Document Released: 04/10/2017 Document Revised: 04/10/2020 Document Reviewed: 02/07/2017  [employer] Patient Education © 2020 [employer]  DASH Eating Plan  DASH stands for \"Dietary Approaches to Stop Hypertension.\" The DASH eating plan is a healthy eating plan that has been shown to reduce high blood pressure (hypertension). It may also reduce your risk for type 2 diabetes, heart disease, and stroke. The DASH eating plan may also help with weight loss.  What are tips for following this plan?    General guidelines  · Avoid eating more than 2,300 mg (milligrams) of salt (sodium) a day. If you have hypertension, you may need to reduce your sodium intake to 1,500 mg a day.  · Limit alcohol intake to no more than 1 drink a day for nonpregnant women and 2 drinks a day for men. One drink equals 12 oz of beer, 5 oz of wine, or 1½ oz of hard liquor.  · Work with your " "health care provider to maintain a healthy body weight or to lose weight. Ask what an ideal weight is for you.  · Get at least 30 minutes of exercise that causes your heart to beat faster (aerobic exercise) most days of the week. Activities may include walking, swimming, or biking.  · Work with your health care provider or diet and nutrition specialist (dietitian) to adjust your eating plan to your individual calorie needs.  Reading food labels    · Check food labels for the amount of sodium per serving. Choose foods with less than 5 percent of the Daily Value of sodium. Generally, foods with less than 300 mg of sodium per serving fit into this eating plan.  · To find whole grains, look for the word \"whole\" as the first word in the ingredient list.  Shopping  · Buy products labeled as \"low-sodium\" or \"no salt added.\"  · Buy fresh foods. Avoid canned foods and premade or frozen meals.  Cooking  · Avoid adding salt when cooking. Use salt-free seasonings or herbs instead of table salt or sea salt. Check with your health care provider or pharmacist before using salt substitutes.  · Do not contreras foods. Cook foods using healthy methods such as baking, boiling, grilling, and broiling instead.  · Cook with heart-healthy oils, such as olive, canola, soybean, or sunflower oil.  Meal planning  · Eat a balanced diet that includes:  ? 5 or more servings of fruits and vegetables each day. At each meal, try to fill half of your plate with fruits and vegetables.  ? Up to 6-8 servings of whole grains each day.  ? Less than 6 oz of lean meat, poultry, or fish each day. A 3-oz serving of meat is about the same size as a deck of cards. One egg equals 1 oz.  ? 2 servings of low-fat dairy each day.  ? A serving of nuts, seeds, or beans 5 times each week.  ? Heart-healthy fats. Healthy fats called Omega-3 fatty acids are found in foods such as flaxseeds and coldwater fish, like sardines, salmon, and mackerel.  · Limit how much you eat of " the following:  ? Canned or prepackaged foods.  ? Food that is high in trans fat, such as fried foods.  ? Food that is high in saturated fat, such as fatty meat.  ? Sweets, desserts, sugary drinks, and other foods with added sugar.  ? Full-fat dairy products.  · Do not salt foods before eating.  · Try to eat at least 2 vegetarian meals each week.  · Eat more home-cooked food and less restaurant, buffet, and fast food.  · When eating at a restaurant, ask that your food be prepared with less salt or no salt, if possible.  What foods are recommended?  The items listed may not be a complete list. Talk with your dietitian about what dietary choices are best for you.  Grains  Whole-grain or whole-wheat bread. Whole-grain or whole-wheat pasta. Brown rice. Oatmeal. Quinoa. Bulgur. Whole-grain and low-sodium cereals. Bve bread. Low-fat, low-sodium crackers. Whole-wheat flour tortillas.  Vegetables  Fresh or frozen vegetables (raw, steamed, roasted, or grilled). Low-sodium or reduced-sodium tomato and vegetable juice. Low-sodium or reduced-sodium tomato sauce and tomato paste. Low-sodium or reduced-sodium canned vegetables.  Fruits  All fresh, dried, or frozen fruit. Canned fruit in natural juice (without added sugar).  Meat and other protein foods  Skinless chicken or turkey. Ground chicken or turkey. Pork with fat trimmed off. Fish and seafood. Egg whites. Dried beans, peas, or lentils. Unsalted nuts, nut butters, and seeds. Unsalted canned beans. Lean cuts of beef with fat trimmed off. Low-sodium, lean deli meat.  Dairy  Low-fat (1%) or fat-free (skim) milk. Fat-free, low-fat, or reduced-fat cheeses. Nonfat, low-sodium ricotta or cottage cheese. Low-fat or nonfat yogurt. Low-fat, low-sodium cheese.  Fats and oils  Soft margarine without trans fats. Vegetable oil. Low-fat, reduced-fat, or light mayonnaise and salad dressings (reduced-sodium). Canola, safflower, olive, soybean, and sunflower oils. Avocado.  Seasoning and  other foods  Herbs. Spices. Seasoning mixes without salt. Unsalted popcorn and pretzels. Fat-free sweets.  What foods are not recommended?  The items listed may not be a complete list. Talk with your dietitian about what dietary choices are best for you.  Grains  Baked goods made with fat, such as croissants, muffins, or some breads. Dry pasta or rice meal packs.  Vegetables  Creamed or fried vegetables. Vegetables in a cheese sauce. Regular canned vegetables (not low-sodium or reduced-sodium). Regular canned tomato sauce and paste (not low-sodium or reduced-sodium). Regular tomato and vegetable juice (not low-sodium or reduced-sodium). Pickles. Olives.  Fruits  Canned fruit in a light or heavy syrup. Fried fruit. Fruit in cream or butter sauce.  Meat and other protein foods  Fatty cuts of meat. Ribs. Fried meat. [place]. Sausage. Bologna and other processed lunch meats. Salami. Fatback. Hotdogs. Bratwurst. Salted nuts and seeds. Canned beans with added salt. Canned or smoked fish. Whole eggs or egg yolks. Chicken or turkey with skin.  Dairy  Whole or 2% milk, cream, and half-and-half. Whole or full-fat cream cheese. Whole-fat or sweetened yogurt. Full-fat cheese. Nondairy creamers. Whipped toppings. Processed cheese and cheese spreads.  Fats and oils  Butter. Stick margarine. Lard. Shortening. Ghee. [place] fat. Tropical oils, such as coconut, palm kernel, or palm oil.  Seasoning and other foods  Salted popcorn and pretzels. Onion salt, garlic salt, seasoned salt, table salt, and sea salt. Worcestershire sauce. Tartar sauce. Barbecue sauce. Teriyaki sauce. Soy sauce, including reduced-sodium. Steak sauce. Canned and packaged gravies. Fish sauce. Oyster sauce. Cocktail sauce. Horseradish that you find on the shelf. Ketchup. Mustard. Meat flavorings and tenderizers. Bouillon cubes. Hot sauce and Tabasco sauce. Premade or packaged marinades. Premade or packaged taco seasonings. Relishes. Regular salad dressings.  Where to  find more information:  · National Heart, Lung, and Blood [place]: www.nhlbi.nih.gov  · American Heart Association: www.heart.org  Summary  · The DASH eating plan is a healthy eating plan that has been shown to reduce high blood pressure (hypertension). It may also reduce your risk for type 2 diabetes, heart disease, and stroke.  · With the DASH eating plan, you should limit salt (sodium) intake to 2,300 mg a day. If you have hypertension, you may need to reduce your sodium intake to 1,500 mg a day.  · When on the DASH eating plan, aim to eat more fresh fruits and vegetables, whole grains, lean proteins, low-fat dairy, and heart-healthy fats.  · Work with your health care provider or diet and nutrition specialist (dietitian) to adjust your eating plan to your individual calorie needs.  This information is not intended to replace advice given to you by your health care provider. Make sure you discuss any questions you have with your health care provider.  Document Released: 12/06/2012 Document Revised: 11/30/2018 Document Reviewed: 12/11/2017  [employer] Patient Education © 2020 [employer] Inc.

## 2020-11-02 PROBLEM — R74.01 TRANSAMINITIS: Status: ACTIVE | Noted: 2020-11-02

## 2020-11-02 PROBLEM — R80.9 MICROALBUMINURIA: Status: ACTIVE | Noted: 2020-11-02

## 2020-11-02 PROBLEM — R73.01 IFG (IMPAIRED FASTING GLUCOSE): Status: ACTIVE | Noted: 2020-11-02

## 2021-01-05 ENCOUNTER — OCCUPATIONAL MEDICINE (OUTPATIENT)
Dept: URGENT CARE | Facility: CLINIC | Age: 46
End: 2021-01-05
Payer: COMMERCIAL

## 2021-01-05 ENCOUNTER — APPOINTMENT (OUTPATIENT)
Dept: RADIOLOGY | Facility: IMAGING CENTER | Age: 46
End: 2021-01-05
Attending: NURSE PRACTITIONER
Payer: COMMERCIAL

## 2021-01-05 ENCOUNTER — NON-PROVIDER VISIT (OUTPATIENT)
Dept: URGENT CARE | Facility: CLINIC | Age: 46
End: 2021-01-05
Payer: COMMERCIAL

## 2021-01-05 VITALS
SYSTOLIC BLOOD PRESSURE: 122 MMHG | DIASTOLIC BLOOD PRESSURE: 92 MMHG | RESPIRATION RATE: 16 BRPM | HEIGHT: 70 IN | HEART RATE: 92 BPM | BODY MASS INDEX: 33.64 KG/M2 | WEIGHT: 235 LBS | OXYGEN SATURATION: 96 % | TEMPERATURE: 97.7 F

## 2021-01-05 DIAGNOSIS — S89.92XA INJURY OF LEFT KNEE, INITIAL ENCOUNTER: ICD-10-CM
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
## 2021-01-05 DIAGNOSIS — S86.912A KNEE STRAIN, LEFT, INITIAL ENCOUNTER: ICD-10-CM

## 2021-01-05 LAB
AMP AMPHETAMINE: NORMAL
BAR BARBITURATES: NORMAL
BREATH ALCOHOL COMMENT: NORMAL
BZO BENZODIAZEPINES: NORMAL
COC COCAINE: NORMAL
INT CON NEG: NORMAL
INT CON POS: NORMAL
MDMA ECSTASY: NORMAL
MET METHAMPHETAMINES: NORMAL
MTD METHADONE: NORMAL
OPI OPIATES: NORMAL
OXY OXYCODONE: NORMAL
PCP PHENCYCLIDINE: NORMAL
POC BREATHALIZER: 0 PERCENT (ref 0–0.01)
POC URINE DRUG SCREEN OCDRS: NEGATIVE
THC: NORMAL

## 2021-01-05 PROCEDURE — 73562 X-RAY EXAM OF KNEE 3: CPT | Mod: TC,LT | Performed by: NURSE PRACTITIONER

## 2021-01-05 PROCEDURE — 82075 ASSAY OF BREATH ETHANOL: CPT | Performed by: NURSE PRACTITIONER

## 2021-01-05 PROCEDURE — 99214 OFFICE O/P EST MOD 30 MIN: CPT | Performed by: NURSE PRACTITIONER

## 2021-01-05 PROCEDURE — 80305 DRUG TEST PRSMV DIR OPT OBS: CPT | Performed by: NURSE PRACTITIONER

## 2021-01-05 ASSESSMENT — FIBROSIS 4 INDEX: FIB4 SCORE: 1.15

## 2021-01-05 NOTE — LETTER
"   [facility]  [address] [facility] [address] [address]  Phone:  [phone] - Fax:  [phone]   [facility] Progress Report and Disability Certification  Date of Service: 1/5/2021   No Show:  No  Date / Time of Next Visit: 1/11/2021 @ 8:00AM [facility]   Claim Information   Patient Name: Teja Michel  Claim Number:     Employer: [employer]  Date of Injury: 10/13/2020     Insurer / TPA: [employer]  ID / SSN:     Occupation: Skilled   Diagnosis: Diagnoses of Injury of left knee, initial encounter and Knee strain, left, initial encounter were pertinent to this visit.    Medical Information   Related to Industrial Injury? Yes    Subjective Complaints:  DOI: 10/03/2020:  Patient is a 45-year-old male presents to urgent care for evaluation of a left knee injury that occurred while he was at work.  Patient states that he was bending down and picking up a \" foil\" when he turned and twisted to the right he felt a sharp immediate pain in his left knee.  Patient states that preceding this on a few different occasions when his foot would get caught, while twisting at work, he would feel pain in his left knee, the pain would shortly subside.  When he twisted lifting the foil the pain was much more significant. He rested, iced, occasionally took ibuprofen and thought it would improve.  Patient states that he has been working increased hours, which seems to have exacerbated his pain.  Patient states that in the past week the pain had become noticeably worse, which he took time off work due to his pain and he did see an improvement.  Patient denies previous injury to the left knee.  Patient denies second job.   Objective Findings: Left knee: No gross deformities, skin without erythema, ecchymosis.  There is some edema in the medial aspect.  Tenderness palpation of the medial aspect.  Valgus and valgus positive.  " Gait antalgic.  DTRs +2. +AROM with discomfort.   Pre-Existing Condition(s):     Assessment:   Initial Visit    Status: Additional Care Required  Permanent Disability:No    Plan: Transfer Care  Comments:Encourage patient to rest, ice, anti-inflammatories for pain.  Patient does not wish to be placed on temporary work restrictions would like to continue to work full duty.  Patient will follow up with occupational medicine in 1 week for reevaluation.    Diagnostics: X-ray    Comments:  Xray results  Negative     Disability Information   Status: Released to Full Duty    From:  1/5/2021  Through: 1/11/2021 Restrictions are:     Physical Restrictions   Sitting:    Standing:    Stooping:    Bending:      Squatting:    Walking:    Climbing:    Pushing:      Pulling:    Other:    Reaching Above Shoulder (L):   Reaching Above Shoulder (R):       Reaching Below Shoulder (L):    Reaching Below Shoulder (R):      Not to exceed Weight Limits   Carrying(hrs):   Weight Limit(lb):   Lifting(hrs):   Weight  Limit(lb):     Comments:      Repetitive Actions   Hands: i.e. Fine Manipulations from Grasping:     Feet: i.e. Operating Foot Controls:     Driving / Operate Machinery:     Provider Name:   PAT Arciniega Physician Signature:  Physician Name:     Clinic Name / Location: [address] [place] [address] Clinic Phone Number: Dept: [phone]   Appointment Time: 9:45 Pm Visit Start Time: 10:00 PM   Check-In Time:  9:46 Pm Visit Discharge Time:  11:20PM   Original-Treating Physician or Chiropractor    Page 2-Insurer/TPA    Page 3-Employer    Page 4-Employee

## 2021-01-05 NOTE — LETTER
"EMPLOYEE’S CLAIM FOR COMPENSATION/ REPORT OF INITIAL TREATMENT  FORM C-4    EMPLOYEE’S CLAIM - PROVIDE ALL INFORMATION REQUESTED   First Name  Teja Last Name  Anand Birthdate                    1975                Sex  male Claim Number   Home Address  [address] Age  45 y.o. Height  1.778 m (5' 10\") Weight  106.6 kg (235 lb) [facility]     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 (if applicable)  lifting and turning picking up foil    How did this injury or occupational disease occur? (Be specific an answer in detail. Use additional sheet if necessary)  Picked up a core of foil and turned and my left knee felt like a sharp pain in my inside (right side) of left knee   If you believe that you have an occupational disease, when did you first have knowledge of the disability and it relationship to your employment?  N/A Witnesses to the Accident  [facility]      Nature of Injury or Occupational Disease  Defer  Part(s) of Body Injured or Affected  Knee (L), ,     I certify that the above is true and correct to the best " of my knowledge and that I have provided this information in order to obtain the benefits of [place]  I hereby authorize any physician, chiropractor, surgeon, practitioner, or other person, any hospital, including [facility] or [facility], any medical service organization, any insurance company, or other institution or organization to release to each other, any medical or other information, including benefits paid or payable, pertinent to this injury or disease, except information relative to diagnosis, treatment and/or counseling for AIDS, psychological conditions, alcohol or controlled substances, for which I must give specific authorization.  A Photostat of this authorization shall be as valid as the original.     Date   Place   Employee’s Signature   THIS REPORT MUST BE COMPLETED AND MAILED WITHIN 3 WORKING DAYS OF TREATMENT   Place  [facility]  Name of Facility  [facility]   Date  1/5/2021 Diagnosis  (S89.92XA) Injury of left knee, initial encounter  (S86.912A) Knee strain, left, initial encounter Is there evidence the injured employee was under the              influence of alcohol and/or another controlled substance at the time of accident?   Hour  10:00 PM Description of Injury or Disease  Diagnoses of Injury of left knee, initial encounter and Knee strain, left, initial encounter were pertinent to this visit. No   Treatment  Encourage patient to rest, ice, anti-inflammatories for pain.  Patient does not wish to be placed on temporary work restrictions would like to continue to work full duty.  Patient will follow up with occupational medicine in 1 week for reevaluation.  Have you advised the patient to remain off work five days or     more? No   X-Ray Findings  Negative   If Yes   From Date  To Date      From information given by the employee, together with medical  "evidence, can you directly connect this injury or occupational disease as job incurred?  Yes If No Full Duty    Yes Modified Duty      Is additional medical care by a physician indicated?  Yes If Modified Duty, Specify any Limitations / Restrictions      Do you know of any previous injury or disease contributing to this condition or occupational disease?                            No   Date  1/5/2021 Print Doctor’s Name   PAT Arciniega I certify the employer’s copy of  this form was mailed on:   Address  [address] [place] [address] [address]     [facility]  [address]    Provider’s Tax ID Number  [id number] Telephone  Dept: [phone]      eva-MARTINE Shabazz  Signature:     Degree          ORIGINAL-TREATING PHYSICIAN OR CHIROPRACTOR    PAGE 2-INSURER/TPA    PAGE 3-EMPLOYER    PAGE 4-EMPLOYEE        Form C-4 (rev.10/07)           BRIEF DESCRIPTION OF RIGHTS AND BENEFITS  (Pursuant to [place] 616C.050)    Notice of Injury or Occupational Disease (Incident Report Form C-1): If an injury or occupational disease (OD) arises out of and in the course of employment, you must provide written notice to your employer as soon as practicable, but no later than 7 days after the accident or OD. Your employer shall maintain a sufficient supply of the required forms.    Claim for Compensation (Form C-4): If medical treatment is sought, the form C-4 is available at the place of initial treatment. A completed \"Claim for Compensation\" (Form C-4) must be filed within 90 days after an accident or OD. The treating physician or chiropractor must, within 3 working days after treatment, complete and mail to the employer, the employer's insurer and third-party , the Claim for Compensation.    Medical Treatment: If you require medical treatment for your on-the-job injury or OD, you may be required to select a physician or chiropractor from a list provided by your workers’ compensation " insurer, if it has contracted with an Organization for Managed Care (MCO) or Preferred Provider Organization (PPO) or providers of health care. If your employer has not entered into a contract with an MCO or PPO, you may select a physician or chiropractor from the Panel of Physicians and Chiropractors. Any medical costs related to your industrial injury or OD will be paid by your insurer.    Temporary Total Disability (TTD): If your doctor has certified that you are unable to work for a period of at least 5 consecutive days, or 5 cumulative days in a 20-day period, or places restrictions on you that your employer does not accommodate, you may be entitled to TTD compensation.    Temporary Partial Disability (TPD): If the wage you receive upon reemployment is less than the compensation for TTD to which you are entitled, the insurer may be required to pay you TPD compensation to make up the difference. TPD can only be paid for a maximum of 24 months.    Permanent Partial Disability (PPD): When your medical condition is stable and there is an indication of a PPD as a result of your injury or OD, within 30 days, your insurer must arrange for an evaluation by a rating physician or chiropractor to determine the degree of your PPD. The amount of your PPD award depends on the date of injury, the results of the PPD evaluation, your age and wage.    Permanent Total Disability (PTD): If you are medically certified by a treating physician or chiropractor as permanently and totally disabled and have been granted a PTD status by your insurer, you are entitled to receive monthly benefits not to exceed 66 2/3% of your average monthly wage. The amount of your PTD payments is subject to reduction if you previously received a lump-sum PPD award.    Vocational Rehabilitation Services: You may be eligible for vocational rehabilitation services if you are unable to return to the job due to a permanent physical impairment or permanent  restrictions as a result of your injury or occupational disease.    Transportation and Per Mami Reimbursement: You may be eligible for travel expenses and per mami associated with medical treatment.    Reopening: You may be able to reopen your claim if your condition worsens after claim closure.     Appeal Process: If you disagree with a written determination issued by the insurer or the insurer does not respond to your request, you may appeal to the Department of Administration, , by following the instructions contained in your determination letter. You must appeal the determination within 70 days from the date of the determination letter at [address], [address], [place], [place] [address], or [address] [facility], [place], [place] [address]. If you disagree with the  decision, you may appeal to the Department of Administration, . You must file your appeal within 30 days from the date of the  decision letter at [address], [address], [place], [place] [address], or [address] [facility] [facility] [address], [place], [place] [address]. If you disagree with a decision of an , you may file a petition for judicial review with the District Court. You must do so within 30 days of the Appeal Officer’s decision. You may be represented by an  at your own expense or you may contact the [facility] for possible representation.    [place]  for Injured Workers (NAIW): If you disagree with a  decision, you may request that NAIW represent you without charge at an  Hearing. For information regarding denial of benefits, you may contact the [facility] at: [address] [facility] [address], [address], [place] [address], [phone], or [address] [facility], [address], [place] [address], [phone]    To File a Complaint with the Division: If you wish to file a complaint with the  of the Division  of Industrial Relations (DIR),  please contact the Workers’ Compensation Section, [address], [address], [place], [place] [address], telephone [phone]) [phone], or [address], [address], [place], [place] [address], telephone [phone].    For assistance with Workers’ Compensation Issues: You may contact the [facility] Office for Consumer Health Assistance, [address], [address], [place], [place] [address], Toll Free 1-863.658.5599, Web site: http://[facility].[place].gov/Programs/SARINA E-mail: sarina@[facility].nv.gov              __________________________________________________________________                                    _________________            Employee Name / Signature                                                                                                                            Date                                                                                                                                                                                                                              D-2 (rev. 10/20)

## 2021-01-06 ASSESSMENT — ENCOUNTER SYMPTOMS
DIZZINESS: 0
NAUSEA: 0
CHILLS: 0
SHORTNESS OF BREATH: 0
SORE THROAT: 0
MYALGIAS: 0
EYE REDNESS: 0
FEVER: 0
VOMITING: 0

## 2021-01-07 NOTE — PROGRESS NOTES
"Subjective:   Teja Michel  is a 45 y.o. male who presents for No chief complaint on file.    DOI: 10/03/2020:  Patient is a 45-year-old male presents to urgent care for evaluation of a left knee injury that occurred while he was at work.  Patient states that he was bending down and picking up a \" foil\" when he turned and twisted to the right he felt a sharp immediate pain in his left knee.  Patient states that preceding this on a few different occasions when his foot would get caught, while twisting at work, he would feel pain in his left knee, the pain would shortly subside.  When he twisted lifting the foil the pain was much more significant. He rested, iced, occasionally took ibuprofen and thought it would improve.  Patient states that he has been working increased hours, which seems to have exacerbated his pain.  Patient states that in the past week the pain had become noticeably worse, which he took time off work due to his pain and he did see an improvement.  Patient denies previous injury to the left knee.  Patient denies second job.   HPI  Review of Systems   Constitutional: Negative for chills and fever.   HENT: Negative for sore throat.    Eyes: Negative for redness.   Respiratory: Negative for shortness of breath.    Cardiovascular: Negative for chest pain.   Gastrointestinal: Negative for nausea and vomiting.   Genitourinary: Negative for dysuria.   Musculoskeletal: Positive for joint pain. Negative for myalgias.   Skin: Negative for rash.   Neurological: Negative for dizziness.     Allergies   Allergen Reactions   • Aspirin      Facial swelling      Objective:   /92 (BP Location: Left arm, Patient Position: Sitting, BP Cuff Size: Adult long)   Pulse 92   Temp 36.5 °C (97.7 °F) (Temporal)   Resp 16   Ht 1.778 m (5' 10\")   Wt 106.6 kg (235 lb)   SpO2 96%   BMI 33.72 kg/m²   Physical Exam  Constitutional:       Appearance: Normal appearance. He is not ill-appearing or " toxic-appearing.   HENT:      Head: Normocephalic.      Right Ear: External ear normal.      Left Ear: External ear normal.      Nose: Nose normal.      Mouth/Throat:      Lips: Pink.      Mouth: Mucous membranes are moist.   Eyes:      General: Lids are normal.         Right eye: No discharge.         Left eye: No discharge.   Neck:      Musculoskeletal: Full passive range of motion without pain.   Pulmonary:      Effort: Pulmonary effort is normal. No accessory muscle usage or respiratory distress.   Musculoskeletal:      Left knee: He exhibits decreased range of motion and swelling. He exhibits no effusion, no deformity, no erythema, normal patellar mobility and no bony tenderness. Tenderness found. Medial joint line tenderness noted. No lateral joint line and no patellar tendon tenderness noted.   Skin:     Coloration: Skin is not pale.   Neurological:      Mental Status: He is alert and oriented to person, place, and time.   Psychiatric:         Mood and Affect: Mood normal.         Thought Content: Thought content normal.       Left knee: No gross deformities, skin without erythema, ecchymosis.  There is some edema in the medial aspect.  Tenderness palpation of the medial aspect.  Valgus and valgus positive.  Gait antalgic.  DTRs +2. +AROM with discomfort.   Assessment/Plan:   1. Injury of left knee, initial encounter  - DX-KNEE 3 VIEWS LEFT; Future  - REFERRAL TO OCCUPATIONAL MEDICINE    2. Knee strain, left, initial encounter  - REFERRAL TO OCCUPATIONAL MEDICINE  Xray results   Negative knee series aside from mild patellar enthesopathy.    Encourage patient to rest, ice, anti-inflammatories for pain.  Patient does not wish to be placed on temporary work restrictions would like to continue to work full duty.  Patient will follow up with occupational medicine in 1 week for reevaluation.    Differential diagnosis, natural history, supportive care, and indications for immediate follow-up discussed.

## 2021-08-14 ENCOUNTER — OCCUPATIONAL MEDICINE (OUTPATIENT)
Dept: URGENT CARE | Facility: CLINIC | Age: 46
End: 2021-08-14
Payer: COMMERCIAL

## 2021-08-14 ENCOUNTER — HOSPITAL ENCOUNTER (OUTPATIENT)
Facility: MEDICAL CENTER | Age: 46
End: 2021-08-14
Attending: FAMILY MEDICINE
Payer: COMMERCIAL

## 2021-08-14 VITALS
OXYGEN SATURATION: 98 % | WEIGHT: 201.8 LBS | HEIGHT: 70 IN | HEART RATE: 89 BPM | BODY MASS INDEX: 28.89 KG/M2 | DIASTOLIC BLOOD PRESSURE: 80 MMHG | TEMPERATURE: 96.9 F | SYSTOLIC BLOOD PRESSURE: 120 MMHG | RESPIRATION RATE: 16 BRPM

## 2021-08-14 DIAGNOSIS — Z02.6 ENCOUNTER RELATED TO WORKER'S COMPENSATION CLAIM: ICD-10-CM

## 2021-08-14 DIAGNOSIS — L50.9 HIVES: ICD-10-CM

## 2021-08-14 LAB
AMP AMPHETAMINE: NORMAL
BAR BARBITURATES: NORMAL
BREATH ALCOHOL COMMENT: 0
BZO BENZODIAZEPINES: NORMAL
COC COCAINE: NORMAL
INT CON NEG: NORMAL
INT CON POS: NORMAL
MDMA ECSTASY: NORMAL
MET METHAMPHETAMINES: NORMAL
MTD METHADONE: NORMAL
OPI OPIATES: NORMAL
OXY OXYCODONE: NORMAL
PCP PHENCYCLIDINE: NORMAL
POC BREATHALIZER: 0 PERCENT (ref 0–0.01)
POC URINE DRUG SCREEN OCDRS: NEGATIVE
THC: NORMAL

## 2021-08-14 PROCEDURE — 99213 OFFICE O/P EST LOW 20 MIN: CPT | Performed by: FAMILY MEDICINE

## 2021-08-14 PROCEDURE — 87070 CULTURE OTHR SPECIMN AEROBIC: CPT

## 2021-08-14 PROCEDURE — 87205 SMEAR GRAM STAIN: CPT

## 2021-08-14 PROCEDURE — U0005 INFEC AGEN DETEC AMPLI PROBE: HCPCS

## 2021-08-14 PROCEDURE — 82075 ASSAY OF BREATH ETHANOL: CPT | Performed by: FAMILY MEDICINE

## 2021-08-14 PROCEDURE — U0003 INFECTIOUS AGENT DETECTION BY NUCLEIC ACID (DNA OR RNA); SEVERE ACUTE RESPIRATORY SYNDROME CORONAVIRUS 2 (SARS-COV-2) (CORONAVIRUS DISEASE [COVID-19]), AMPLIFIED PROBE TECHNIQUE, MAKING USE OF HIGH THROUGHPUT TECHNOLOGIES AS DESCRIBED BY CMS-2020-01-R: HCPCS

## 2021-08-14 PROCEDURE — 80305 DRUG TEST PRSMV DIR OPT OBS: CPT | Performed by: FAMILY MEDICINE

## 2021-08-14 RX ORDER — PREDNISONE 10 MG/1
30 TABLET ORAL DAILY
Qty: 9 TABLET | Refills: 0 | Status: SHIPPED | OUTPATIENT
Start: 2021-08-14 | End: 2021-08-17

## 2021-08-14 ASSESSMENT — FIBROSIS 4 INDEX: FIB4 SCORE: 1.18

## 2021-08-14 NOTE — LETTER
[facility] [address] [address]  Phone:  [phone] - Fax:  [phone]   Occ[facility]ational [facility] Progress Report and Disability Certification  Date of Service: 8/14/2021   No Show:  No  Date / Time of Next Visit:     Claim Information   Patient Name: Teja Michel  Claim Number:     Employer: [employer]  Date of Injury: 8/12/2021     Ins[facility] TPA[facility] ID / SSN:     Occupation: SMOI  Diagnosis: Diagnoses of Encounter related to worker's compensation claim and Hives were pertinent to this visit.    Medical Information   Related to Industrial Injury? Yes    Subjective Complaints:      DOI:   8/12      Pt reports he noticed a rash on both arms today, at work.    He took benadryl and topical hydrocortisone, but the rash did not improve.   He also wears a cooling vest at work, that contains only water and alcohol and he did notice a leak.    The rash has been gradually worsening .   Notes that the rash is itchy.        Pertinent negatives include no cough, diarrhea or fever.       Objective Findings: Rash (there is a macular evanascent rash over both forearms, rt flank area.   Rash blanches w/pressure.   No d/c.   No TTP.      Pre-Existing Condition(s):     Assessment:        Status: Additional Care Required  Permanent Disability:No    Plan: Medication    Diagnostics:      Comments:       Disability Information   Status: Released to Full Duty    From:     Through:   Restrictions are:     Physical Restrictions   Sitting:    Standing:    Stooping:    Bending:      Squatting:    Walking:    Climbing:    Pushing:      Pulling:    Other:    Reaching Above Shoulder (L):   Reaching Above Shoulder (R):       Reaching Below Shoulder (L):    Reaching Below Shoulder (R):      Not to exceed Weight Limits   Carrying(hrs):   Weight Limit(lb):   Lifting(hrs):   Weight  Limit(lb):     Comments:  Hives        - CULTURE WOUND W/ GRAM  STAIN; Future  - predniSONE (DELTASONE) 10 MG Tab; Take 3 Tablets by mouth every day for 3 days.  Dispense: 9 Tablet; Refill: 0   Will send screen for COVID  Home isolation per CDC guidelines     Work status: full duty      F/u 2-3 d    Repetitive Actions   Hands: i.e. Fine Manipulations from Grasping:     Feet: i.e. Operating Foot Controls:     Driving / Operate Machinery:     Provider Name:   Kenyon Gardner M.D. Physician Signature:  Physician Name:     Clinic Name / Location: [address] [address] Clinic Phone Number: Dept: [phone]   Appointment Time: 10:30 Pm Visit Start Time: 12:00 AM   Check-In Time:  10:44 Pm Visit Discharge Time:  12:04 PM    Original-Treating Physician or Chiropractor    Page 2-Insurer/TPA    Page 3-Employer    Page 4-Employee

## 2021-08-14 NOTE — LETTER
"EMPLOYEE’S CLAIM FOR COMPENSATION/ REPORT OF INITIAL TREATMENT  FORM C-4    EMPLOYEE’S CLAIM - PROVIDE ALL INFORMATION REQUESTED   First Name  Teja Last Name  Anand Birthdate                    1975                Sex  male Claim Number   Home Address  [address]  [place] Age  46 y.o. Height  1.778 m (5' 10\") Weight  91.5 kg (201 lb 12.8 oz) [facility]     [facility] Zip  [address] Telephone  [phone] (home)    Mailing Address  [address]  [place] [facility] Zip  [address] Primary Language Spoken  English    Insurer   Third Party    [employer]   Employee's Occupation (Job Title) When Injury or Occupational Disease Occurred  [employer]    Employer's Name  [email or web address]  Telephone  [phone]    Employer Address  [address]  Zip  [address]    Date of Injury  8/12/2021               Hour of Injury  11:00 PM Date Employer Notified  8/12/2021 Last Day of Work after Injury     or Occupational Disease  8/14/2021 Supervisor to Whom Injury     Reported  Nora   Address or Location of Accident (if applicable)  [nost sure allergic reaction]   What were you doing at the time of accident? (if applicable)  going to lunch & noticed - not sure    How did this injury or occupational disease occur? (Be specific an answer in detail. Use additional sheet if necessary)  not sure possible from leak from my cooling vest   If you believe that you have an occupational disease, when did you first have knowledge of the disability and it relationship to your employment?   Witnesses to the Accident  Yunior blair) Moshe (Supervisor)      Nature of Injury or Occupational Disease  Workers' Compensation  Part(s) of Body Injured or Affected  Lower Arm (L), Lower Arm (R),     I certify that the above is true and correct to the best of my knowledge and that I have provided this " information in order to obtain the benefits of [place]  I hereby authorize any physician, chiropractor, surgeon, practitioner, or other person, any hospital, including [facility] hospital, any medical service organization, any insurance company, or other institution or organization to release to each other, any medical or other information, including benefits paid or payable, pertinent to this injury or disease, except information relative to diagnosis, treatment and/or counseling for AIDS, psychological conditions, alcohol or controlled substances, for which I must give specific authorization.  A Photostat of this authorization shall be as valid as the original.     Date   [facility]    Employee’s Signature   THIS REPORT MUST BE COMPLETED AND MAILED WITHIN 3 WORKING DAYS OF TREATMENT   [facility]  Name of Facility  [facility]   Date  8/14/2021 Diagnosis  (Z02.6) Encounter related to worker's compensation claim  (L50.9) Hives Is there evidence the injured employee was under the              influence of alcohol and/or another controlled substance at the time of accident?   Hour  12:00 AM Description of Injury or Disease  Diagnoses of Encounter related to worker's compensation claim and Hives were pertinent to this visit. No   Treatment   Hives        - CULTURE WOUND W/ GRAM STAIN; Future  - predniSONE (DELTASONE) 10 MG Tab; Take 3 Tablets by mouth every day for 3 days.  Dispense: 9 Tablet; Refill: 0   Will send screen for COVID  Home isolation per CDC guidelines     Work status: full duty      F/u 2-3 d  Have you advised the patient to remain off work five days or     more? No   X-Ray Findings      If Yes   From Date  To Date      From information given by the employee, together with medical evidence, can you directly connect this injury or occupational disease  "as job incurred?  Yes If No Full Duty    Yes Modified Duty      Is additional medical care by a physician indicated?  Yes If Modified Duty, Specify any Limitations / Restrictions      Do you know of any previous injury or disease contributing to this condition or occupational disease?                            No   Date  8/15/2021 Print Doctor’s Name   Kenyon Gardner M.D. I certify the employer’s copy of  this form was mailed on:   Address  [address] Insurer’s Use Only     [facility] [facility] Zip  [address]    Provider’s Tax ID Number  [id number] Telephone  Dept: [phone]      e-KENYON Elliott M.D.  Signature:     Degree          ORIGINAL-TREATING PHYSICIAN OR CHIROPRACTOR    PAGE 2-INSURER/TPA    PAGE 3-EMPLOYER    PAGE 4-EMPLOYEE        Form C-4 (rev.10/07)           BRIEF DESCRIPTION OF RIGHTS AND BENEFITS  (Pursuant to [place] 616C.050)    Notice of Injury or Occupational Disease (Incident Report Form C-1): If an injury or occupational disease (OD) arises out of and in the course of employment, you must provide written notice to your employer as soon as practicable, but no later than 7 days after the accident or OD. Your employer shall maintain a sufficient supply of the required forms.    Claim for Compensation (Form C-4): If medical treatment is sought, the form C-4 is available at the place of initial treatment. A completed \"Claim for Compensation\" (Form C-4) must be filed within 90 days after an accident or OD. The treating physician or chiropractor must, within 3 working days after treatment, complete and mail to the employer, the employer's insurer and third-party , the Claim for Compensation.    Medical Treatment: If you require medical treatment for your on-the-job injury or OD, you may be required to select a physician or chiropractor from a list provided by your workers’ compensation insurer, if it has contracted with an Organization for Managed Care (MCO) or " Preferred Provider Organization (PPO) or providers of health care. If your employer has not entered into a contract with an MCO or PPO, you may select a physician or chiropractor from the Panel of Physicians and Chiropractors. Any medical costs related to your industrial injury or OD will be paid by your insurer.    Temporary Total Disability (TTD): If your doctor has certified that you are unable to work for a period of at least 5 consecutive days, or 5 cumulative days in a 20-day period, or places restrictions on you that your employer does not accommodate, you may be entitled to TTD compensation.    Temporary Partial Disability (TPD): If the wage you receive upon reemployment is less than the compensation for TTD to which you are entitled, the insurer may be required to pay you TPD compensation to make up the difference. TPD can only be paid for a maximum of 24 months.    Permanent Partial Disability (PPD): When your medical condition is stable and there is an indication of a PPD as a result of your injury or OD, within 30 days, your insurer must arrange for an evaluation by a rating physician or chiropractor to determine the degree of your PPD. The amount of your PPD award depends on the date of injury, the results of the PPD evaluation, your age and wage.    Permanent Total Disability (PTD): If you are medically certified by a treating physician or chiropractor as permanently and totally disabled and have been granted a PTD status by your insurer, you are entitled to receive monthly benefits not to exceed 66 2/3% of your average monthly wage. The amount of your PTD payments is subject to reduction if you previously received a lump-sum PPD award.    Vocational Rehabilitation Services: You may be eligible for vocational rehabilitation services if you are unable to return to the job due to a permanent physical impairment or permanent restrictions as a result of your injury or occupational  disease.    Transportation and Per Mami Reimbursement: You may be eligible for travel expenses and per mami associated with medical treatment.    Reopening: You may be able to reopen your claim if your condition worsens after claim closure.     Appeal Process: If you disagree with a written determination issued by the insurer or the insurer does not respond to your request, you may appeal to the Department of Administration, , by following the instructions contained in your determination letter. You must appeal the determination within 70 days from the date of the determination letter at [address], [address], [place], [place] [address], or [address], [address], [place], [place] [address]. If you disagree with the  decision, you may appeal to the Department of Administration, . You must file your appeal within 30 days from the date of the  decision letter at [address], [address], [place], [place] [address], or [address], [place], [place] [address]. If you disagree with a decision of an , you may file a petition for judicial review with the District Court. You must do so within 30 days of the Appeal Officer’s decision. You may be represented by an  at your own expense or you may contact the [facility] for possible representation.    [place]  for Injured Workers (NAIW): If you disagree with a  decision, you may request that NAIW represent you without charge at an  Hearing. For information regarding denial of benefits, you may contact the [facility] at: [address], [address], [place] [address], [phone], or [address], [place] [address], [phone]    To File a Complaint with the Division: If you wish to file a complaint with the  of the Division of Industrial Relations (DIR),  please contact the  Workers’ Compensation Section, [address], [address], [place], [place] [address], telephone [phone]) [phone], or [address], [address], [place], [place] [address], telephone [phone].    For assistance with Workers’ Compensation Issues: You may contact the [facility] Office for Consumer Health Assistance, [address], [address], [place], [place] [address], Toll Free 1-742.987.4230, Web site: http://[facility].[place].gov/Programs/SARINA E-mail: sarina@[facility].nv.gov              __________________________________________________________________                                    _________________            Employee Name / Signature                                                                                                                            Date                                                                                                                                                                                                                              D-2 (rev. 10/20)

## 2021-08-15 DIAGNOSIS — L50.9 HIVES: ICD-10-CM

## 2021-08-15 LAB — COVID ORDER STATUS COVID19: NORMAL

## 2021-08-15 NOTE — PROGRESS NOTES
"Subjective:      Chief Complaint   Patient presents with   • Allergic Reaction     Rash x 2 days            DOI:   8/12      Pt reports he noticed a rash on both arms today, at work.    He took benadryl and topical hydrocortisone, but the rash did not improve.   He also wears a cooling vest at work, that contains only water and alcohol and he did notice a leak.    The rash has been gradually worsening .   Notes that the rash is itchy.        Pertinent negatives include no cough, diarrhea or fever.        Current Outpatient Medications on File Prior to Visit   Medication Sig Dispense Refill   • ibuprofen (MOTRIN) 600 MG Tab Take 600 mg by mouth every 6 hours as needed.       No current facility-administered medications on file prior to visit.       Social History     Tobacco Use   • Smoking status: Former Smoker     Quit date: 10/14/2010     Years since quitting: 10.8   • Smokeless tobacco: Never Used   Vaping Use   • Vaping Use: Never used   Substance Use Topics   • Alcohol use: No   • Drug use: No         Past Medical History:   Diagnosis Date   • GERD (gastroesophageal reflux disease)    • Hyperlipidemia              Review of Systems   Constitutional: Negative for fever.   Respiratory: Negative for cough.    Gastrointestinal: Negative for diarrhea.   Skin: Positive for rash.          Objective:     /80 (BP Location: Left arm, Patient Position: Sitting, BP Cuff Size: Adult)   Pulse 89   Temp 36.1 °C (96.9 °F) (Temporal)   Resp 16   Ht 1.778 m (5' 10\")   Wt 91.5 kg (201 lb 12.8 oz)   SpO2 98%     Physical Exam   Constitutional: pt is oriented to person, place, and time. Pt appears well-developed. No distress.   HENT:   Head: Normocephalic and atraumatic.   Eyes: Conjunctivae are normal.   Cardiovascular: Normal rate, regular rhythm and normal heart sounds.    Pulmonary/Chest: Effort normal and breath sounds normal. No respiratory distress.   Neurological: pt is alert and oriented to person, place, and " time. No cranial nerve deficit.   Skin: Skin is warm. Rash (there is a macular evanascent rash over both forearms, rt flank area.   Rash blanches w/pressure.   No d/c.   No TTP.        Psychiatric:  behavior is normal.   Nursing note and vitals reviewed.              Assessment/Plan:         rash     Likely hives        - CULTURE WOUND W/ GRAM STAIN; Future  - predniSONE (DELTASONE) 10 MG Tab; Take 3 Tablets by mouth every day for 3 days.  Dispense: 9 Tablet; Refill: 0   Will send screen for COVID  Home isolation per CDC guidelines     Work status: full duty      F/u 2-3 d

## 2021-08-16 LAB
GRAM STN SPEC: NORMAL
SARS-COV-2 RNA RESP QL NAA+PROBE: NOTDETECTED
SIGNIFICANT IND 70042: NORMAL
SITE SITE: NORMAL
SOURCE SOURCE: NORMAL
SPECIMEN SOURCE: NORMAL

## 2021-08-18 ENCOUNTER — OCCUPATIONAL MEDICINE (OUTPATIENT)
Dept: OCCUPATIONAL MEDICINE | Facility: CLINIC | Age: 46
End: 2021-08-18
Payer: COMMERCIAL

## 2021-08-18 VITALS
DIASTOLIC BLOOD PRESSURE: 80 MMHG | BODY MASS INDEX: 29.06 KG/M2 | SYSTOLIC BLOOD PRESSURE: 124 MMHG | WEIGHT: 203 LBS | OXYGEN SATURATION: 95 % | TEMPERATURE: 98.7 F | HEIGHT: 70 IN | HEART RATE: 111 BPM

## 2021-08-18 DIAGNOSIS — L25.9 CONTACT DERMATITIS, UNSPECIFIED CONTACT DERMATITIS TYPE, UNSPECIFIED TRIGGER: ICD-10-CM

## 2021-08-18 LAB
BACTERIA WND AEROBE CULT: NORMAL
GRAM STN SPEC: NORMAL
SIGNIFICANT IND 70042: NORMAL
SITE SITE: NORMAL
SOURCE SOURCE: NORMAL

## 2021-08-18 PROCEDURE — 99203 OFFICE O/P NEW LOW 30 MIN: CPT | Performed by: PREVENTIVE MEDICINE

## 2021-08-18 ASSESSMENT — FIBROSIS 4 INDEX: FIB4 SCORE: 1.18

## 2021-08-18 NOTE — PROGRESS NOTES
"Subjective:     Teja Michel is a 46 y.o. male who presents for Follow-Up (DOi 08/12/21- rash )      DOI 8/12/2021: 47 yo injured worker presents with rash. HARDY: Patient noted gradual onset of rash while at work.  He was wearing cooling vest which was leaking.  He noticed the rash on the right side of his body where the leak was as well as his right arm.  He was seen in urgent care, given prednisone x3 days.  Wound cultures were also obtained and Covid test was obtained as well both which were negative.  Patient states the rash is cleared up with prednisone.  He states for the most part the cooling vest's are maintained and are not leaking and so does not expect another exposure to the liquid.    ROS: All systems were reviewed on intake form, form was reviewed and signed. See scanned documents in media. Pertinent positives and negatives included in HPI.    PMH: No pertinent past medical history to this problem  MEDS: Medications were reviewed in Epic  ALLERGIES:   Allergies   Allergen Reactions   • Aspirin      Facial swelling     SOCHX: Works as cathode  at [employer]  FH: No pertinent family history to this problem       Objective:     /80   Pulse (!) 111   Temp 37.1 °C (98.7 °F)   Ht 1.778 m (5' 10\")   Wt 92.1 kg (203 lb)   SpO2 95%   BMI 29.13 kg/m²     Constitutional: Patient is in no acute distress. Appears well-developed and well-nourished.   HENT: Normocephalic and atraumatic. EOM are normal. No scleral icterus.   Cardiovascular: Normal rate.    Pulmonary/Chest: Effort normal. No respiratory distress.   Neurological: Patient is alert and oriented to person, place, and time.   Skin: Skin is warm and dry.   Psychiatric: Normal mood and affect. Behavior is normal.     Chest/abdomen: No dermatitis or rash visualized.  Was shown a picture of what appears to be mild dermatitis on the right side of the flank from original injury  Arms: No evidence of dermatitis on the arms or upper " extremities.    Assessment/Plan:       1. Contact dermatitis, unspecified contact dermatitis type, unspecified trigger    Released to Full Duty FROM 8/18/2021 TO       Rash appears to have resolved readily with prednisone  No other obvious candidate for allergic reaction other than liquid to which he was exposed  Released from care  Full duty  Follow-up as needed    Differential diagnosis, natural history, supportive care, and indications for immediate follow-up discussed.    Approximately 35 minutes were spent in reviewing notes, preparing for visit, obtaining history, exam and evaluation, patient counseling/education and post visit documentation/orders.

## 2022-04-26 NOTE — LETTER
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    Medical Information   Related to Industrial Injury? Yes    Subjective Complaints:  DOI 8/12/2021: 47 yo injured worker presents with rash. HARDY: Patient noted gradual onset of rash while at work.  He was wearing cooling vest which was leaking.  He noticed the rash on the right side of his body where the leak was as well as his right arm.  He was seen in urgent care, given prednisone x3 days.  Wound cultures were also obtained and Covid test was obtained as well both which were negative.  Patient states the rash is cleared up with prednisone.  He states for the most part the cooling vest's are maintained and are not leaking and so does not expect another exposure to the liquid.   Objective Findings: Chest/abdomen: No dermatitis or rash visualized.  Was shown a picture of what appears to be mild dermatitis on the right side of the flank from original injury  Arms: No evidence of dermatitis on the arms or upper extremities.   Pre-Existing Condition(s):     Assessment:   Condition Improved    Status: Discharged /  MMI  Permanent Disability:No    Plan:      Diagnostics:      Comments:  Rash appears to have resolved readily with prednisone  No other obvious candidate for allergic reaction other than liquid to which he was exposed  Released from care  Full duty  Follow-up as needed    Disability  Information   Status: Released to Full Duty    From:  8/18/2021  Through:   Restrictions are:     Physical Restrictions   Sitting:    Standing:    Stooping:    Bending:      Squatting:    Walking:    Climbing:    Pushing:      Pulling:    Other:    Reaching Above Shoulder (L):   Reaching Above Shoulder (R):       Reaching Below Shoulder (L):    Reaching Below Shoulder (R):      Not to exceed Weight Limits   Carrying(hrs):   Weight Limit(lb):   Lifting(hrs):   Weight  Limit(lb):     Comments:      Repetitive Actions   Hands: i.e. Fine Manipulations from Grasping:     Feet: i.e. Operating Foot Controls:     Driving / Operate Machinery:     Provider Name:   Ac Choudhary D.O. Physician Signature:  Physician Name:     Clinic Name / Location: [address],   [address] [address] Clinic Phone Number: Dept: [phone]   Appointment Time: 11:00 Am Visit Start Time: 10:53 AM   Check-In Time:  10:39 Am Visit Discharge Time: 11:16 am    Original-Treating Physician or Chiropractor    Page 2-Insurer/TPA    Page 3-Employer    Page 4-Employee     Statement Selected

## 2023-04-13 ENCOUNTER — OFFICE VISIT (OUTPATIENT)
Dept: URGENT CARE | Facility: CLINIC | Age: 48
End: 2023-04-13
Payer: COMMERCIAL

## 2023-04-13 VITALS
HEIGHT: 69 IN | DIASTOLIC BLOOD PRESSURE: 90 MMHG | OXYGEN SATURATION: 96 % | RESPIRATION RATE: 16 BRPM | SYSTOLIC BLOOD PRESSURE: 138 MMHG | BODY MASS INDEX: 35.44 KG/M2 | HEART RATE: 106 BPM | WEIGHT: 239.3 LBS | TEMPERATURE: 97.6 F

## 2023-04-13 DIAGNOSIS — K21.9 GASTROESOPHAGEAL REFLUX DISEASE, UNSPECIFIED WHETHER ESOPHAGITIS PRESENT: ICD-10-CM

## 2023-04-13 DIAGNOSIS — R19.7 DIARRHEA OF PRESUMED INFECTIOUS ORIGIN: ICD-10-CM

## 2023-04-13 DIAGNOSIS — R11.2 NAUSEA AND VOMITING, UNSPECIFIED VOMITING TYPE: ICD-10-CM

## 2023-04-13 DIAGNOSIS — R10.9 ABDOMINAL CRAMPING: ICD-10-CM

## 2023-04-13 DIAGNOSIS — I10 HYPERTENSION, UNSPECIFIED TYPE: ICD-10-CM

## 2023-04-13 PROCEDURE — 99214 OFFICE O/P EST MOD 30 MIN: CPT | Performed by: NURSE PRACTITIONER

## 2023-04-13 RX ORDER — ONDANSETRON 4 MG/1
4 TABLET, ORALLY DISINTEGRATING ORAL EVERY 8 HOURS PRN
Qty: 10 TABLET | Refills: 0 | Status: SHIPPED | OUTPATIENT
Start: 2023-04-13 | End: 2023-10-05

## 2023-04-13 RX ORDER — DICYCLOMINE HCL 20 MG
20 TABLET ORAL EVERY 6 HOURS
Qty: 120 TABLET | Refills: 0 | Status: SHIPPED | OUTPATIENT
Start: 2023-04-13 | End: 2023-10-05

## 2023-04-13 NOTE — LETTER
April 13, 2023         Patient: Teja Michel   YOB: 1975   Date of Visit: 4/13/2023           To Whom it May Concern:    Teja Michel was seen in my clinic on 4/13/2023. He may return to work on 4/15/23.    If you have any questions or concerns, please don't hesitate to call.        Sincerely,           DELGADO Arciniega.  Electronically Signed

## 2023-04-14 ASSESSMENT — ENCOUNTER SYMPTOMS
DIARRHEA: 1
MYALGIAS: 0
HEADACHES: 0
SHORTNESS OF BREATH: 0
BLOATING: 1
BLOOD IN STOOL: 0
CHILLS: 0
EYE PAIN: 0
ABDOMINAL PAIN: 1
SORE THROAT: 0
NAUSEA: 0
VOMITING: 0
SWEATS: 0
FEVER: 0
CONSTIPATION: 0
DIZZINESS: 0
COUGH: 1
FLATUS: 0

## 2023-04-14 NOTE — PROGRESS NOTES
Subjective:   Teja Michel is a 47 y.o. male who presents for Diarrhea (Cough, runny nose x 1 day )      Diarrhea   This is a new problem. The current episode started yesterday. The problem occurs more than 10 times per day. The problem has been unchanged. The stool consistency is described as Watery. The patient states that diarrhea awakens him from sleep. Associated symptoms include abdominal pain, bloating and coughing. Pertinent negatives include no chills, fever, headaches, increased  flatus, myalgias, sweats, URI or vomiting. Nothing aggravates the symptoms. There are no known risk factors. He has tried increased fluids for the symptoms. The treatment provided no relief. His past medical history is significant for irritable bowel syndrome. There is no history of inflammatory bowel disease or a recent abdominal surgery.     Review of Systems   Constitutional:  Positive for malaise/fatigue. Negative for chills and fever.   HENT:  Positive for congestion. Negative for sore throat.    Eyes:  Negative for pain.   Respiratory:  Positive for cough. Negative for shortness of breath.    Cardiovascular:  Negative for chest pain.   Gastrointestinal:  Positive for abdominal pain, bloating and diarrhea. Negative for blood in stool, constipation, flatus, melena, nausea and vomiting.   Genitourinary:  Negative for hematuria.   Musculoskeletal:  Negative for myalgias.   Skin:  Negative for rash.   Neurological:  Negative for dizziness and headaches.     Medications:    dicyclomine Tabs  ibuprofen Tabs  ondansetron Tbdp    Allergies: Aspirin    Problem List: Teja Michel does not have any pertinent problems on file.    Surgical History:  Past Surgical History:   Procedure Laterality Date    APPENDECTOMY      HERNIA REPAIR Right     inguinal       Past Social Hx: Teja Michel  reports that he quit smoking about 12 years ago. His smoking use included cigarettes. He has never used smokeless tobacco.  "He reports that he does not drink alcohol and does not use drugs.     Past Family Hx:  Teja Michel family history includes Heart Attack in his father; Heart Disease in his father; Hyperlipidemia in his father; Hypertension in his father; No Known Problems in his sister; Thyroid in his mother.     Problem list, medications, and allergies reviewed by myself today in Epic.     Objective:     BP (!) 138/90   Pulse (!) 106   Temp 36.4 °C (97.6 °F) (Temporal)   Resp 16   Ht 1.753 m (5' 9\")   Wt 109 kg (239 lb 4.8 oz)   SpO2 96%   BMI 35.34 kg/m²     Physical Exam  Vitals and nursing note reviewed.   Constitutional:       General: He is not in acute distress.     Appearance: He is well-developed.   HENT:      Head: Normocephalic and atraumatic.      Right Ear: External ear normal.      Left Ear: External ear normal.      Nose: Nose normal.      Mouth/Throat:      Mouth: Mucous membranes are moist.   Eyes:      Conjunctiva/sclera: Conjunctivae normal.   Cardiovascular:      Rate and Rhythm: Normal rate.   Pulmonary:      Effort: Pulmonary effort is normal. No respiratory distress.      Breath sounds: Normal breath sounds.   Abdominal:      General: Bowel sounds are increased. There is no distension.      Palpations: Abdomen is soft.      Tenderness: There is abdominal tenderness. There is no right CVA tenderness, left CVA tenderness, guarding or rebound. Negative signs include Rose's sign, Rovsing's sign, McBurney's sign, psoas sign and obturator sign.      Hernia: No hernia is present.   Musculoskeletal:         General: Normal range of motion.   Skin:     General: Skin is warm and dry.   Neurological:      General: No focal deficit present.      Mental Status: He is alert and oriented to person, place, and time. Mental status is at baseline.      Gait: Gait (gait at baseline) normal.   Psychiatric:         Judgment: Judgment normal.       Assessment/Plan:     Diagnosis and associated orders:     1. " Diarrhea of presumed infectious origin        2. Nausea and vomiting, unspecified vomiting type  ondansetron (ZOFRAN ODT) 4 MG TABLET DISPERSIBLE      3. Gastroesophageal reflux disease, unspecified whether esophagitis present        4. Abdominal cramping  dicyclomine (BENTYL) 20 MG Tab      5. Hypertension, unspecified type           Comments/MDM:   Patient in acute illness with systemic symptoms.  Patient in no acute abdominal tenderness, no rebound or guarding, vital signs stable.  It was explained today that due to the viral nature of the pt's illness, we will treat symptomatically today.   Encouraged OTC supportive meds PRN. Humidification, increase fluids,   Discussed side effects of OTC meds and any prescribed.  Given precautionary s/sx that mandate immediate follow up and evaluation in the ED. Advised of risks of not doing so.    DDX, Supportive care, and indications for immediate follow-up discussed with patient.    Instructed to return to clinic or nearest emergency department if we are not available for any change in condition, further concerns, or worsening of symptoms.    The patient  and/or guardian demonstrated a good understanding and agreed with the treatment plan.    Your blood pressure is elevated here in Urgent Care. Please monitor your blood pressure over the next several days. If your blood pressure continues to be 120/80 or higher please contact your physician for blood pressure management.               Please note that this dictation was created using voice recognition software. I have made a reasonable attempt to correct obvious errors, but I expect that there are errors of grammar and possibly content that I did not discover before finalizing the note.    This note was electronically signed by Johny SOLIS

## 2023-10-05 ENCOUNTER — APPOINTMENT (OUTPATIENT)
Dept: URGENT CARE | Facility: CLINIC | Age: 48
End: 2023-10-05

## 2023-10-05 ENCOUNTER — OFFICE VISIT (OUTPATIENT)
Dept: URGENT CARE | Facility: CLINIC | Age: 48
End: 2023-10-05
Payer: COMMERCIAL

## 2023-10-05 VITALS
HEIGHT: 69 IN | BODY MASS INDEX: 35.84 KG/M2 | SYSTOLIC BLOOD PRESSURE: 132 MMHG | OXYGEN SATURATION: 95 % | TEMPERATURE: 98.1 F | RESPIRATION RATE: 16 BRPM | DIASTOLIC BLOOD PRESSURE: 82 MMHG | WEIGHT: 242 LBS | HEART RATE: 115 BPM

## 2023-10-05 DIAGNOSIS — J06.9 VIRAL URI WITH COUGH: ICD-10-CM

## 2023-10-05 DIAGNOSIS — J02.9 SORE THROAT: ICD-10-CM

## 2023-10-05 LAB
FLUAV RNA SPEC QL NAA+PROBE: NEGATIVE
FLUBV RNA SPEC QL NAA+PROBE: NEGATIVE
RSV RNA SPEC QL NAA+PROBE: NEGATIVE
S PYO DNA SPEC NAA+PROBE: NOT DETECTED
SARS-COV-2 RNA RESP QL NAA+PROBE: NEGATIVE

## 2023-10-05 PROCEDURE — 0241U POCT CEPHEID COV-2, FLU A/B, RSV - PCR: CPT | Performed by: PHYSICIAN ASSISTANT

## 2023-10-05 PROCEDURE — 3079F DIAST BP 80-89 MM HG: CPT | Performed by: PHYSICIAN ASSISTANT

## 2023-10-05 PROCEDURE — 3075F SYST BP GE 130 - 139MM HG: CPT | Performed by: PHYSICIAN ASSISTANT

## 2023-10-05 PROCEDURE — 87651 STREP A DNA AMP PROBE: CPT | Performed by: PHYSICIAN ASSISTANT

## 2023-10-05 PROCEDURE — 99214 OFFICE O/P EST MOD 30 MIN: CPT | Performed by: PHYSICIAN ASSISTANT

## 2023-10-05 RX ORDER — LIDOCAINE HYDROCHLORIDE 20 MG/ML
5 SOLUTION OROPHARYNGEAL 3 TIMES DAILY PRN
Qty: 100 ML | Refills: 0 | Status: SHIPPED | OUTPATIENT
Start: 2023-10-05 | End: 2023-11-18

## 2023-10-05 RX ORDER — ALBUTEROL SULFATE 90 UG/1
2 AEROSOL, METERED RESPIRATORY (INHALATION) EVERY 6 HOURS PRN
Qty: 8.5 G | Refills: 0 | Status: SHIPPED | OUTPATIENT
Start: 2023-10-05 | End: 2023-11-18

## 2023-10-05 ASSESSMENT — ENCOUNTER SYMPTOMS
MYALGIAS: 1
TROUBLE SWALLOWING: 0
COUGH: 1
CHILLS: 1
SORE THROAT: 1
VOMITING: 0
PALPITATIONS: 0
WHEEZING: 0
DIARRHEA: 0
HOARSE VOICE: 0
FEVER: 0
SWOLLEN GLANDS: 1
GASTROINTESTINAL NEGATIVE: 1
SHORTNESS OF BREATH: 0
HEADACHES: 1
ABDOMINAL PAIN: 0

## 2023-10-05 NOTE — LETTER
October 5, 2023         Patient: Teja Michel   YOB: 1975   Date of Visit: 10/5/2023           To Whom it May Concern:    Teja Michel was seen in my clinic on 10/5/2023. Please excuse any absences from work this week due to acute illness.        If you have any questions or concerns, please don't hesitate to call.        Sincerely,           Burke Richardson P.A.-C.  Electronically Signed

## 2023-10-06 NOTE — PROGRESS NOTES
Subjective     Teja Michel is a very pleasant 48 y.o. male who presents with Headache (X2 days Cough, sneezing, headaches, sore throat)            Pharyngitis   This is a new problem. The current episode started in the past 7 days. The problem has been unchanged. There has been no fever. The fever has been present for Less than 1 day. Associated symptoms include congestion, coughing, headaches and swollen glands. Pertinent negatives include no abdominal pain, diarrhea, ear pain, hoarse voice, shortness of breath, trouble swallowing or vomiting. He has had no exposure to strep. He has tried acetaminophen (Dayquil) for the symptoms. The treatment provided mild relief.       PMH:  has a past medical history of GERD (gastroesophageal reflux disease) and Hyperlipidemia.  MEDS:   Current Outpatient Medications:     ibuprofen (MOTRIN) 600 MG Tab, Take 600 mg by mouth every 6 hours as needed., Disp: , Rfl:   ALLERGIES:   Allergies   Allergen Reactions    Aspirin      Facial swelling     SURGHX:   Past Surgical History:   Procedure Laterality Date    APPENDECTOMY      HERNIA REPAIR Right     inguinal     SOCHX:  reports that he quit smoking about 12 years ago. His smoking use included cigarettes. He has never used smokeless tobacco. He reports that he does not drink alcohol and does not use drugs.  FH: family history includes Heart Attack in his father; Heart Disease in his father; Hyperlipidemia in his father; Hypertension in his father; No Known Problems in his sister; Thyroid in his mother.      Review of Systems   Constitutional:  Positive for chills. Negative for fever.   HENT:  Positive for congestion and sore throat. Negative for ear pain, hoarse voice and trouble swallowing.    Respiratory:  Positive for cough. Negative for shortness of breath and wheezing.    Cardiovascular:  Negative for chest pain, palpitations and leg swelling.   Gastrointestinal: Negative.  Negative for abdominal pain, diarrhea and  "vomiting.   Musculoskeletal:  Positive for myalgias.   Neurological:  Positive for headaches.       Medications, Allergies, and current problem list reviewed today in Epic           Objective     /82 (BP Location: Left arm, Patient Position: Sitting, BP Cuff Size: Adult)   Pulse (!) 115   Temp 36.7 °C (98.1 °F)   Resp 16   Ht 1.753 m (5' 9\")   Wt 110 kg (242 lb)   SpO2 95%   BMI 35.74 kg/m²      Physical Exam  Vitals and nursing note reviewed.   Constitutional:       General: He is not in acute distress.     Appearance: Normal appearance. He is well-developed. He is not ill-appearing, toxic-appearing or diaphoretic.   HENT:      Head: Normocephalic and atraumatic.      Right Ear: Tympanic membrane, ear canal and external ear normal.      Left Ear: Tympanic membrane, ear canal and external ear normal.      Nose: Congestion and rhinorrhea present.      Mouth/Throat:      Mouth: Mucous membranes are moist.      Pharynx: Oropharynx is clear. No oropharyngeal exudate or posterior oropharyngeal erythema.   Eyes:      General:         Right eye: No discharge.         Left eye: No discharge.      Conjunctiva/sclera: Conjunctivae normal.   Cardiovascular:      Rate and Rhythm: Regular rhythm. Tachycardia present.      Pulses: Normal pulses.      Heart sounds: Normal heart sounds. No murmur heard.  Pulmonary:      Effort: Pulmonary effort is normal. No respiratory distress.      Breath sounds: Normal breath sounds. No wheezing, rhonchi or rales.   Chest:      Chest wall: No tenderness.   Musculoskeletal:         General: No swelling or tenderness.      Cervical back: Normal range of motion and neck supple.      Right lower leg: No edema.      Left lower leg: No edema.   Lymphadenopathy:      Cervical: No cervical adenopathy.   Skin:     General: Skin is warm and dry.   Neurological:      General: No focal deficit present.      Mental Status: He is alert and oriented to person, place, and time. Mental status is at " baseline.   Psychiatric:         Mood and Affect: Mood normal.         Behavior: Behavior normal.         Thought Content: Thought content normal.         Judgment: Judgment normal.                             Assessment & Plan     This is a very pleasant 48-year-old male presenting 2 days of viral URI symptoms.  Cough, congestion, fatigue, sneezing and sore throat.  No fever chills or bodies.  Shortness of breath, chest pain, wheezing, leg swelling or calf tenderness.  Eating and drinking normal without vomiting or diarrhea.  No sick contacts.  No pertinent past respiratory history.  PO2 95% vital signs normal.  Slight tachycardia however pulse is closer to 100 bimanually.  Nasal congestion and rhinorrhea noted.  Slight pharynx erythema but no tonsillar enlargement exudate or cervical adenopathy noted.  Lungs are clear bilaterally without wheezing rhonchi or rails.  Remainder of exam benign/reassuring.  In clinic COVID, flu, RSV, strep testing negative. No clinical symptoms/signs of focal bacterial infection.  Patient will be treated for self-limiting viral URI with OTC meds, conservative measures, and symptomatic relief.  ER and red flag symptoms discussed at length.       1. Sore throat  POCT CEPHEID COV-2, FLU A/B, RSV - PCR    POCT CEPHEID GROUP A STREP - PCR      2. Viral URI with cough  albuterol 108 (90 Base) MCG/ACT Aero Soln inhalation aerosol    lidocaine (XYLOCAINE) 2 % Solution           I personally reviewed prior external notes and test results pertinent to today's visit. Return to clinic or go to ED if symptoms worsen or persist. Red flag symptoms and indications for ED discussed at length. Patient/Parent/Guardian voices understanding.  AVS with post-visit instructions provided or given verbally.  Follow-up with your primary care provider in 3-5 days. All side effects and potential interactions of prescribed medication discussed including allergic response, GI upset, tendon injury, rash, sedation,  OCP effectiveness, etc.    Please note that this dictation was created using voice recognition software. I have made every reasonable attempt to correct obvious errors, but I expect that there are errors of grammar and possibly content that I did not discover before finalizing the note.

## 2023-11-18 ENCOUNTER — OFFICE VISIT (OUTPATIENT)
Dept: URGENT CARE | Facility: CLINIC | Age: 48
End: 2023-11-18
Payer: COMMERCIAL

## 2023-11-18 VITALS
OXYGEN SATURATION: 92 % | HEIGHT: 70 IN | WEIGHT: 240.5 LBS | DIASTOLIC BLOOD PRESSURE: 82 MMHG | RESPIRATION RATE: 18 BRPM | BODY MASS INDEX: 34.43 KG/M2 | TEMPERATURE: 97.3 F | SYSTOLIC BLOOD PRESSURE: 134 MMHG | HEART RATE: 113 BPM

## 2023-11-18 DIAGNOSIS — M54.41 ACUTE BILATERAL LOW BACK PAIN WITH BILATERAL SCIATICA: ICD-10-CM

## 2023-11-18 DIAGNOSIS — M54.42 ACUTE BILATERAL LOW BACK PAIN WITH BILATERAL SCIATICA: ICD-10-CM

## 2023-11-18 PROCEDURE — 3079F DIAST BP 80-89 MM HG: CPT | Performed by: NURSE PRACTITIONER

## 2023-11-18 PROCEDURE — 99213 OFFICE O/P EST LOW 20 MIN: CPT | Performed by: NURSE PRACTITIONER

## 2023-11-18 PROCEDURE — 3075F SYST BP GE 130 - 139MM HG: CPT | Performed by: NURSE PRACTITIONER

## 2023-11-18 ASSESSMENT — ENCOUNTER SYMPTOMS
PSYCHIATRIC NEGATIVE: 1
BACK PAIN: 1
NEUROLOGICAL NEGATIVE: 1
CARDIOVASCULAR NEGATIVE: 1
GASTROINTESTINAL NEGATIVE: 1
RESPIRATORY NEGATIVE: 1
CONSTITUTIONAL NEGATIVE: 1
EYES NEGATIVE: 1

## 2023-11-18 NOTE — LETTER
November 18, 2023       Patient: Teja Michel   YOB: 1975   Date of Visit: 11/18/2023         To Whom It May Concern:    In my medical opinion, I recommend that Teja Michel be excused from work 11/17/2023 and 11/18/2023 due to medical condition.    If you have any questions or concerns, please don't hesitate to call [phone]          Sincerely,          SUPA Perez.P.R.N.  Electronically Signed

## 2023-11-18 NOTE — PROGRESS NOTES
"Subjective:   Teja Michel is a 48 y.o. male who presents for Back Pain (Pt has back pain, leg pain x 1week) and Foot Pain (Pt has feet pain, numbness, hand pain x 2 years off and on )      Patient presents for evaluation of bilateral sciatic back pain which has been going on for 3 to 4 months, and becoming worse.  Patient states the right side goes from the right low back to the lateral side of the right leg and stops above the knee.  Pain is described as \"an ice pick.\"  The left side goes to the lateral side of the left leg, and into the left lateral calf.  Both sides are intermittent in nature.  Patient does take ibuprofen for this, but reports this is not effective.  He does do some intermittent home exercises that he learned from [employer] that are taught by a physical therapist.  He states these are not effective either.  The last time he had this type of episode he did see a pain specialist who placed him on OxyContin, and he states this was also ineffective.  Additionally, patient states he was previously seen by podiatrist the last time he has had sciatica due to the numbness in his foot.  He was told at that time that he had neuropathy.  Patient states he is prediabetic with an A1c of 5.6.  He denies any bowel or bladder incontinence.  He denies any saddle paresthesias.          Review of Systems   Constitutional: Negative.    HENT: Negative.     Eyes: Negative.    Respiratory: Negative.     Cardiovascular: Negative.    Gastrointestinal: Negative.    Genitourinary: Negative.    Musculoskeletal:  Positive for back pain.   Skin: Negative.    Neurological: Negative.    Psychiatric/Behavioral: Negative.         Medications, Allergies, and current problem list reviewed today in Epic.     Objective:     /82 (BP Location: Left arm, Patient Position: Sitting, BP Cuff Size: Large adult)   Pulse (!) 113   Temp 36.3 °C (97.3 °F) (Temporal)   Resp 18   Ht 1.778 m (5' 10\")   Wt 109 kg (240 lb 8 oz)  "  SpO2 92%     Physical Exam  Vitals reviewed.   Constitutional:       General: He is not in acute distress.     Appearance: Normal appearance. He is not ill-appearing.   HENT:      Head: Normocephalic and atraumatic.      Nose: Nose normal.      Mouth/Throat:      Mouth: Mucous membranes are moist.      Pharynx: Oropharynx is clear.   Eyes:      Extraocular Movements: Extraocular movements intact.      Conjunctiva/sclera: Conjunctivae normal.      Pupils: Pupils are equal, round, and reactive to light.   Cardiovascular:      Rate and Rhythm: Normal rate and regular rhythm.      Pulses: Normal pulses.      Heart sounds: Normal heart sounds.   Pulmonary:      Effort: Pulmonary effort is normal.      Breath sounds: Normal breath sounds.   Abdominal:      General: Abdomen is flat. Bowel sounds are normal.      Palpations: Abdomen is soft.   Musculoskeletal:      Cervical back: Normal, normal range of motion and neck supple.      Thoracic back: Normal.      Lumbar back: Spasms and tenderness present. No swelling, edema, deformity, signs of trauma, lacerations or bony tenderness. Decreased range of motion. Negative right straight leg raise test and negative left straight leg raise test. No scoliosis.      Comments: Pain to palpation over the lower lumbar paraspinal musculature both right and left.  No bony tenderness.  No deformity or step-off palpable.  No erythema or edema.  Range of motion is limited due to pain.  Pain is worse with forward flexion.  Straight leg raise is negative bilaterally.  Reflexes are 2+ and symmetric.  Gait is antalgic.   Skin:     General: Skin is warm and dry.      Capillary Refill: Capillary refill takes less than 2 seconds.   Neurological:      General: No focal deficit present.      Mental Status: He is alert and oriented to person, place, and time.   Psychiatric:         Mood and Affect: Mood normal.         Behavior: Behavior normal.         Assessment/Plan:     Diagnosis and associated  orders:     1. Acute bilateral low back pain with bilateral sciatica  Referral to Physical Therapy    CANCELED: Referral to Pain Clinic         Comments/MDM:     Suspect patient has mechanical low back pain with radicular features.  Recommended patient begin formal PT to help improve strength, function and reduce pain, and develop a home exercise program.  Patient was offered referral to physiatry for further evaluation and treatment, but he declines this due to his last experience. PT referral was placed for him, as well as exercises were given to him to try at home.   Patient will go to the ER for development of any loss of bowel or bladder, or saddle anesthesia.   Follow up with PCP within one week.            Differential diagnosis, natural history, supportive care, and indications for immediate follow-up discussed.    Advised the patient to follow-up with the primary care physician for recheck, reevaluation, and consideration of further management.    Please note that this dictation was created using voice recognition software. I have made a reasonable attempt to correct obvious errors, but I expect that there are errors of grammar and possibly content that I did not discover before finalizing the note.    This note was electronically signed by JOSE RAUL Bernal

## 2024-02-13 ENCOUNTER — OFFICE VISIT (OUTPATIENT)
Dept: URGENT CARE | Facility: CLINIC | Age: 49
End: 2024-02-13
Payer: COMMERCIAL

## 2024-02-13 VITALS
WEIGHT: 240 LBS | OXYGEN SATURATION: 94 % | SYSTOLIC BLOOD PRESSURE: 122 MMHG | HEART RATE: 129 BPM | RESPIRATION RATE: 18 BRPM | DIASTOLIC BLOOD PRESSURE: 88 MMHG | BODY MASS INDEX: 34.44 KG/M2 | TEMPERATURE: 97.5 F

## 2024-02-13 DIAGNOSIS — J02.9 SORE THROAT: ICD-10-CM

## 2024-02-13 DIAGNOSIS — J10.1 INFLUENZA B: ICD-10-CM

## 2024-02-13 LAB
FLUAV RNA SPEC QL NAA+PROBE: NEGATIVE
FLUBV RNA SPEC QL NAA+PROBE: POSITIVE
RSV RNA SPEC QL NAA+PROBE: NEGATIVE
S PYO DNA SPEC NAA+PROBE: NOT DETECTED
SARS-COV-2 RNA RESP QL NAA+PROBE: NEGATIVE

## 2024-02-13 PROCEDURE — 99214 OFFICE O/P EST MOD 30 MIN: CPT | Performed by: PHYSICIAN ASSISTANT

## 2024-02-13 PROCEDURE — 3079F DIAST BP 80-89 MM HG: CPT | Performed by: PHYSICIAN ASSISTANT

## 2024-02-13 PROCEDURE — 3074F SYST BP LT 130 MM HG: CPT | Performed by: PHYSICIAN ASSISTANT

## 2024-02-13 PROCEDURE — 87651 STREP A DNA AMP PROBE: CPT | Performed by: PHYSICIAN ASSISTANT

## 2024-02-13 PROCEDURE — 0241U POCT CEPHEID COV-2, FLU A/B, RSV - PCR: CPT | Performed by: PHYSICIAN ASSISTANT

## 2024-02-13 RX ORDER — OSELTAMIVIR PHOSPHATE 6 MG/ML
75 FOR SUSPENSION ORAL 2 TIMES DAILY
Qty: 125 ML | Refills: 0 | Status: SHIPPED | OUTPATIENT
Start: 2024-02-13 | End: 2024-02-18

## 2024-02-13 ASSESSMENT — ENCOUNTER SYMPTOMS
SHORTNESS OF BREATH: 0
FEVER: 1
COUGH: 1
RHINORRHEA: 1
SORE THROAT: 1
MYALGIAS: 1
HEMOPTYSIS: 0

## 2024-02-13 NOTE — LETTER
[facility]  [address] [facility] [address]     February 13, 2024    Patient: Teja Michel   YOB: 1975   Date of Visit: 2/13/2024       To Whom It May Concern:    Teaj Michel was seen and treated in our department on 2/13/2024.  Please excuse him from work on 2/13 through 2/16/2023.    Sincerely,     Kevin Cohen P.A.-C.

## 2024-02-14 NOTE — PROGRESS NOTES
Subjective:   Teja Michel is a 48 y.o. male who presents for Cough (X Sunday cough, sore throat, HA, painful to swallow, abd pain, N/D)        Cough  This is a new problem. Episode onset: 2 days. The problem has been gradually worsening. The problem occurs constantly. The cough is Non-productive. Associated symptoms include a fever (tactile), myalgias, nasal congestion, rhinorrhea and a sore throat. Pertinent negatives include no hemoptysis or shortness of breath. Nothing aggravates the symptoms. Treatments tried: herbs and supplements. The treatment provided no relief. There is no history of asthma.     Review of Systems   Constitutional:  Positive for fever (tactile).   HENT:  Positive for rhinorrhea and sore throat.    Respiratory:  Positive for cough. Negative for hemoptysis and shortness of breath.    Musculoskeletal:  Positive for myalgias.       PMH:  has a past medical history of GERD (gastroesophageal reflux disease) and Hyperlipidemia.  MEDS:   Current Outpatient Medications:     oseltamivir (TAMIFLU) 6 mg/mL Recon Susp, Take 12.5 mL by mouth 2 times a day for 5 days., Disp: 125 mL, Rfl: 0    ibuprofen (MOTRIN) 600 MG Tab, Take 600 mg by mouth every 6 hours as needed. (Patient not taking: Reported on 2/13/2024), Disp: , Rfl:   ALLERGIES:   Allergies   Allergen Reactions    Aspirin      Facial swelling     SURGHX:   Past Surgical History:   Procedure Laterality Date    APPENDECTOMY      HERNIA REPAIR Right     inguinal     SOCHX:  reports that he quit smoking about 13 years ago. His smoking use included cigarettes. He has never used smokeless tobacco. He reports that he does not drink alcohol and does not use drugs.  FH: Family history was reviewed, no pertinent findings to report   Objective:   /88   Pulse (!) 129   Temp 36.4 °C (97.5 °F) (Temporal)   Resp 18   Wt 109 kg (240 lb)   SpO2 94%   BMI 34.44 kg/m²   Physical Exam  Vitals reviewed.   Constitutional:       General: He is not  in acute distress.     Appearance: Normal appearance. He is well-developed. He is not toxic-appearing.   HENT:      Head: Normocephalic and atraumatic.      Right Ear: External ear normal.      Left Ear: External ear normal.      Nose: Rhinorrhea present.      Mouth/Throat:      Lips: Pink.      Mouth: Mucous membranes are moist.      Pharynx: Oropharynx is clear. Uvula midline. Posterior oropharyngeal erythema present.   Cardiovascular:      Rate and Rhythm: Normal rate and regular rhythm.      Heart sounds: Normal heart sounds, S1 normal and S2 normal.   Pulmonary:      Effort: Pulmonary effort is normal. No respiratory distress.      Breath sounds: Normal breath sounds. No stridor. No decreased breath sounds, wheezing, rhonchi or rales.   Skin:     General: Skin is dry.   Neurological:      Comments: Alert and oriented.    Psychiatric:         Speech: Speech normal.         Behavior: Behavior normal.           Results for orders placed or performed in visit on 02/13/24   POCT CoV-2, Flu A/B, RSV by PCR   Result Value Ref Range    SARS-CoV-2 by PCR Negative Negative, Invalid    Influenza virus A RNA Negative Negative, Invalid    Influenza virus B, PCR Positive (A) Negative, Invalid    RSV, PCR Negative Negative, Invalid   POCT GROUP A STREP, PCR   Result Value Ref Range    POC Group A Strep, PCR Not Detected Not Detected, Invalid       Assessment/Plan:   1. Influenza B  - oseltamivir (TAMIFLU) 6 mg/mL Recon Susp; Take 12.5 mL by mouth 2 times a day for 5 days.  Dispense: 125 mL; Refill: 0    2. Sore throat  - POCT CoV-2, Flu A/B, RSV by PCR  - POCT GROUP A STREP, PCR    Considerations include but not limited to viral URI, sinusitis, allergic rhinitis, influenza, COVID-19, group A strep.  Testing positive for influenza. Etiology and disease course reviewed. No evidence of lower respiratory involvement on exam today.     Discussed risks, benefits, side effects of Tamiflu. Pt would like to be treated with this  medication.    Recommend symptomatic care.  12-hour Mucinex or Mucinex D as needed for congestion.  May also perform nasal saline rinses 2-3 times a day and begin Flonase daily.  Recommend salt water gargles, lozenges, hot tea with honey, and ibuprofen as needed for sore throat.  Tylenol or ibuprofen as needed for fever control, body aches, and headaches.  Ensure adequate rest and hydration.    Eat a BRAT diet - Bananas, Rice, Applesauce, Toast and other bland foods to prevent inflammation until symptoms resolve. Hydrate with 2-3 liters of water daily.  Recommend Pedialyte or 50/50 water/ Gatorade mix. Avoid strenuous activities and dairy products while having diarrhea. Avoid alcohol, coffee, tea, cola drinks, chocolate, and other foods with caffeine- they increase stomach acid.      Immodium or pepto bismol as needed for diarrhea.    If symptoms fail to improve within 4-5 days, new symptoms develop, symptoms worsen return to clinic or see PCP for reevaluation.    If patient experiences chest pain, pain with breathing, shortness of breath, inability to tolerate oral intake, difficulty speaking in full sentences, severe weakness or dizziness they should call 911 and go to the nearest emergency department for further evaluation.

## 2024-02-23 ENCOUNTER — OFFICE VISIT (OUTPATIENT)
Dept: URGENT CARE | Facility: PHYSICIAN GROUP | Age: 49
End: 2024-02-23
Payer: COMMERCIAL

## 2024-02-23 VITALS
SYSTOLIC BLOOD PRESSURE: 126 MMHG | HEART RATE: 98 BPM | DIASTOLIC BLOOD PRESSURE: 82 MMHG | WEIGHT: 240 LBS | OXYGEN SATURATION: 93 % | TEMPERATURE: 98 F | BODY MASS INDEX: 34.36 KG/M2 | RESPIRATION RATE: 18 BRPM | HEIGHT: 70 IN

## 2024-02-23 DIAGNOSIS — R05.1 ACUTE COUGH: ICD-10-CM

## 2024-02-23 PROCEDURE — 99214 OFFICE O/P EST MOD 30 MIN: CPT | Performed by: REGISTERED NURSE

## 2024-02-23 PROCEDURE — 3079F DIAST BP 80-89 MM HG: CPT | Performed by: REGISTERED NURSE

## 2024-02-23 PROCEDURE — 3074F SYST BP LT 130 MM HG: CPT | Performed by: REGISTERED NURSE

## 2024-02-23 RX ORDER — DEXTROMETHORPHAN HYDROBROMIDE AND PROMETHAZINE HYDROCHLORIDE 15; 6.25 MG/5ML; MG/5ML
5 SYRUP ORAL EVERY 4 HOURS PRN
Qty: 120 ML | Refills: 0 | Status: SHIPPED | OUTPATIENT
Start: 2024-02-23 | End: 2024-03-17

## 2024-02-23 RX ORDER — AZITHROMYCIN 200 MG/5ML
POWDER, FOR SUSPENSION ORAL
Qty: 39 ML | Refills: 0 | Status: SHIPPED | OUTPATIENT
Start: 2024-02-23 | End: 2024-03-17

## 2024-02-23 ASSESSMENT — ENCOUNTER SYMPTOMS
DIZZINESS: 0
HEADACHES: 0
SHORTNESS OF BREATH: 1
COUGH: 1
SPUTUM PRODUCTION: 1
FEVER: 0
SORE THROAT: 0
CHILLS: 0
EYE PAIN: 0

## 2024-02-23 NOTE — LETTER
February 23, 2024         Patient: Teja Michel   YOB: 1975   Date of Visit: 2/23/2024           To Whom it May Concern:    Teja Michel was seen in my clinic on 2/23/2024. Please excuse any absences from this illness, he may return to work on 2/26/24.    If you have any questions or concerns, please don't hesitate to call.        Sincerely,           PAT Salinas  Electronically Signed

## 2024-02-24 NOTE — PROGRESS NOTES
"Subjective:   Teja Michel is a 48 y.o. male who presents for Cough (\"Hurts to cough\", loss of voice, flu sx worsening )      HPI  Dx with flu on 02/13/24 and started on tamiflu. He is presenting for reevaluation today after his symptoms have continued to worsen. He is having continual coughing with thick mucous, coughing fits, shortness of breath at times from coughing. He is using ginger and tumeric and lemon for symptoms. No hx of lung issues. Tolerating PO. Immunizations are current.    Review of Systems   Constitutional:  Negative for chills and fever.   HENT:  Negative for congestion and sore throat.    Eyes:  Negative for pain.   Respiratory:  Positive for cough, sputum production and shortness of breath.    Cardiovascular:  Negative for chest pain.   Skin:  Negative for rash.   Neurological:  Negative for dizziness and headaches.       Allergies   Allergen Reactions    Aspirin      Facial swelling       Patient Active Problem List    Diagnosis Date Noted    Transaminitis 11/02/2020    IFG (impaired fasting glucose) 11/02/2020    Microalbuminuria 11/02/2020    Obesity (BMI 30-39.9) 10/14/2020    Gastroesophageal reflux disease 10/14/2020    Dyslipidemia 10/14/2020    History of peptic ulcer 10/14/2020    Dysphagia 10/14/2020       Current Outpatient Medications Ordered in Epic   Medication Sig Dispense Refill    azithromycin (ZITHROMAX) 200 MG/5ML Recon Susp 12.5ml (500mg) on day 1, then 6.3ml (252mg) 2-5 day 39 mL 0    promethazine-dextromethorphan (PROMETHAZINE-DM) 6.25-15 MG/5ML syrup Take 5 mL by mouth every four hours as needed for Cough. 120 mL 0    ibuprofen (MOTRIN) 600 MG Tab Take 600 mg by mouth every 6 hours as needed. (Patient not taking: Reported on 2/13/2024)       No current [facility]-ordered facility-administered medications on file.       Past Surgical History:   Procedure Laterality Date    APPENDECTOMY      HERNIA REPAIR Right     inguinal       Social History     Tobacco Use    " "Smoking status: Former     Current packs/day: 0.00     Types: Cigarettes     Quit date: 10/14/2010     Years since quittin.3    Smokeless tobacco: Never   Vaping Use    Vaping Use: Never used   Substance Use Topics    Alcohol use: No    Drug use: No       family history includes Heart Attack in his father; Heart Disease in his father; Hyperlipidemia in his father; Hypertension in his father; No Known Problems in his sister; Thyroid in his mother.     Problem list, medications, and allergies reviewed by myself today in Epic.     Objective:   /82   Pulse 98   Temp 36.7 °C (98 °F) (Temporal)   Resp 18   Ht 1.778 m (5' 10\")   Wt 109 kg (240 lb)   SpO2 93%   BMI 34.44 kg/m²     Physical Exam  Vitals and nursing note reviewed.   Constitutional:       Appearance: Normal appearance. He is not ill-appearing or toxic-appearing.   HENT:      Right Ear: Tympanic membrane normal.      Left Ear: Tympanic membrane normal.      Nose: Congestion present.      Mouth/Throat:      Mouth: Mucous membranes are moist.   Eyes:      Pupils: Pupils are equal, round, and reactive to light.   Cardiovascular:      Rate and Rhythm: Normal rate and regular rhythm.   Pulmonary:      Effort: Pulmonary effort is normal. No respiratory distress.      Breath sounds: Normal breath sounds.      Comments: Harsh congested cough  Musculoskeletal:         General: Normal range of motion.      Cervical back: Normal range of motion.   Skin:     General: Skin is warm and dry.      Capillary Refill: Capillary refill takes less than 2 seconds.      Findings: No rash.   Neurological:      General: No focal deficit present.      Mental Status: He is alert and oriented to person, place, and time.      Cranial Nerves: No cranial nerve deficit.   Psychiatric:         Mood and Affect: Mood normal.       Assessment/Plan:     1. Acute cough  azithromycin (ZITHROMAX) 200 MG/5ML Recon Susp    promethazine-dextromethorphan (PROMETHAZINE-DM) 6.25-15 MG/5ML " syrup        Patient presenting for reevaluation from a visit on 2/13/2024, at that time diagnosed with flu and started on Tamiflu.  He did complete the course of this medicine but has continued to have worsening cough that is productive with thick mucus.  No fevers but has had fatigue and some shortness of breath that seems to be worse with coughing but sometimes activity.  Thankfully his vital signs are reassuring.  He is not having chest pain.  He does have some congestion and a harsh congested cough throughout exam but he is talking full sentences without distress.  Given the length of time of his symptoms with worsening cough will place on antibiotic azithromycin as well as antitussive syrup promethazine.  He is unable to swallow pills we will send in the liquid form.  Discussed increasing fluids.  Pulmonary toilet.  Work note given.  Strict ER precautions reviewed.    Differential diagnosis, natural history, and supportive care discussed. We also reviewed side effects of medication including allergic response, GI upset, tendon injury, rash, sedation etc. Patient and/or guardian voices understanding.      Advised the patient to follow-up with the primary care physician for recheck, reevaluation, and consideration of further management.    Please note that this dictation was created using voice recognition software. I have made every reasonable attempt to correct obvious errors, but I expect that there are errors of grammar and possibly content that I did not discover before finalizing the note.    This note was electronically signed by PAT Salinas

## 2024-03-17 ENCOUNTER — APPOINTMENT (OUTPATIENT)
Dept: URGENT CARE | Facility: PHYSICIAN GROUP | Age: 49
End: 2024-03-17
Payer: COMMERCIAL

## 2024-03-17 ENCOUNTER — OFFICE VISIT (OUTPATIENT)
Dept: URGENT CARE | Facility: PHYSICIAN GROUP | Age: 49
End: 2024-03-17
Payer: COMMERCIAL

## 2024-03-17 ENCOUNTER — HOSPITAL ENCOUNTER (OUTPATIENT)
Dept: RADIOLOGY | Facility: MEDICAL CENTER | Age: 49
End: 2024-03-17
Attending: FAMILY MEDICINE
Payer: COMMERCIAL

## 2024-03-17 VITALS
DIASTOLIC BLOOD PRESSURE: 78 MMHG | HEART RATE: 108 BPM | TEMPERATURE: 98.3 F | SYSTOLIC BLOOD PRESSURE: 138 MMHG | RESPIRATION RATE: 16 BRPM | HEIGHT: 70 IN | WEIGHT: 250 LBS | OXYGEN SATURATION: 94 % | BODY MASS INDEX: 35.79 KG/M2

## 2024-03-17 DIAGNOSIS — R05.3 CHRONIC COUGH: ICD-10-CM

## 2024-03-17 PROCEDURE — 71046 X-RAY EXAM CHEST 2 VIEWS: CPT

## 2024-03-17 PROCEDURE — 99213 OFFICE O/P EST LOW 20 MIN: CPT | Performed by: FAMILY MEDICINE

## 2024-03-17 PROCEDURE — 3075F SYST BP GE 130 - 139MM HG: CPT | Performed by: FAMILY MEDICINE

## 2024-03-17 PROCEDURE — 3078F DIAST BP <80 MM HG: CPT | Performed by: FAMILY MEDICINE

## 2024-03-17 RX ORDER — DEXTROMETHORPHAN HYDROBROMIDE AND PROMETHAZINE HYDROCHLORIDE 15; 6.25 MG/5ML; MG/5ML
5 SYRUP ORAL EVERY EVENING
Qty: 118 ML | Refills: 0 | Status: SHIPPED | OUTPATIENT
Start: 2024-03-17

## 2024-03-17 ASSESSMENT — ENCOUNTER SYMPTOMS
FEVER: 0
COUGH: 1

## 2024-03-17 NOTE — PROGRESS NOTES
"Subjective:     Teja Michel is a 48 y.o. male who presents for Pharyngitis (Loss of voice, sore throat), Cough (Dry cough, Pt states he feels like he is going to pass out if he coughs too hard, causes headaches), and Gastrophageal Reflux (Feels like he chokes when he coughs at bedtime)    HPI  Pt presents for evaluation of an acute problem  Pt with an illness over a month   First seen about a month ago and diagnosed with influenza B, treated with Tamiflu   Then seen again 10 days later and treated with Azithromycin and promethazine DM   Has not been making improvements   Feeling very fatigued   Feels worse when he is working all day   Has a hoarse voice   Cough is moderate   No new fevers     Review of Systems   Constitutional:  Negative for fever.   Respiratory:  Positive for cough.      PMH:  has a past medical history of GERD (gastroesophageal reflux disease) and Hyperlipidemia.  MEDS: No current outpatient medications on file.  ALLERGIES:   Allergies   Allergen Reactions    Aspirin      Facial swelling     SURGHX:   Past Surgical History:   Procedure Laterality Date    APPENDECTOMY      HERNIA REPAIR Right     inguinal     SOCHX:  reports that he quit smoking about 13 years ago. His smoking use included cigarettes. He has never used smokeless tobacco. He reports that he does not drink alcohol and does not use drugs.     Objective:   /78 (BP Location: Left arm, Patient Position: Sitting, BP Cuff Size: Adult long)   Pulse (!) 108   Temp 36.8 °C (98.3 °F) (Temporal)   Resp 16   Ht 1.778 m (5' 10\")   Wt 113 kg (250 lb)   SpO2 94%   BMI 35.87 kg/m²     Physical Exam  Constitutional:       General: He is not in acute distress.     Appearance: He is well-developed. He is not diaphoretic.   HENT:      Head: Normocephalic and atraumatic.      Right Ear: Tympanic membrane, ear canal and external ear normal.      Left Ear: Tympanic membrane, ear canal and external ear normal.      Nose: Nose normal. "      Mouth/Throat:      Mouth: Mucous membranes are moist.      Pharynx: Oropharynx is clear. No oropharyngeal exudate or posterior oropharyngeal erythema.   Neck:      Trachea: No tracheal deviation.   Cardiovascular:      Rate and Rhythm: Normal rate and regular rhythm.      Heart sounds: Normal heart sounds.   Pulmonary:      Effort: Pulmonary effort is normal. No respiratory distress.      Breath sounds: Normal breath sounds. No wheezing or rales.      Comments: Hoarse voice   Musculoskeletal:         General: Normal range of motion.      Cervical back: Normal range of motion and neck supple. No tenderness.   Lymphadenopathy:      Cervical: No cervical adenopathy.   Skin:     General: Skin is warm and dry.      Findings: No rash.   Neurological:      Mental Status: He is alert.   Psychiatric:         Behavior: Behavior normal.         Thought Content: Thought content normal.         Judgment: Judgment normal.         Assessment/Plan:   Assessment    1. Chronic cough  - DX-CHEST-2 VIEWS; Future    Patient with chronic cough after episode of influenza B.  He has no hypoxia and no infiltrate seen on x-ray.  Suspect that much of his lack of recovery is due to poor sleep.  Per his report, his normal sleep schedule is only about 5 hours at a time and advised that this is inadequate when trying to recover from an acute illness.  He was previously treated with course of azithromycin which did not help symptoms at all.  At this point, I doubt that further antibiotics would have much benefit.  Reviewed the risks and benefits of stronger cough medications and also the risks and benefits of a steroid medication.  Patient would like to trial cough medication alone and avoid steroid medication at this time.  Reviewed other supportive care measures and will follow-up in the urgent care on an as-needed basis.

## 2024-07-30 ENCOUNTER — HOSPITAL ENCOUNTER (OUTPATIENT)
Facility: MEDICAL CENTER | Age: 49
End: 2024-07-30
Payer: COMMERCIAL

## 2024-07-30 ENCOUNTER — OFFICE VISIT (OUTPATIENT)
Dept: URGENT CARE | Facility: CLINIC | Age: 49
End: 2024-07-30
Payer: COMMERCIAL

## 2024-07-30 VITALS
OXYGEN SATURATION: 95 % | TEMPERATURE: 97.6 F | WEIGHT: 245 LBS | DIASTOLIC BLOOD PRESSURE: 100 MMHG | SYSTOLIC BLOOD PRESSURE: 160 MMHG | HEIGHT: 70 IN | RESPIRATION RATE: 28 BRPM | HEART RATE: 138 BPM | BODY MASS INDEX: 35.07 KG/M2

## 2024-07-30 DIAGNOSIS — R42 DIZZINESS: ICD-10-CM

## 2024-07-30 DIAGNOSIS — R11.0 NAUSEA: ICD-10-CM

## 2024-07-30 DIAGNOSIS — R73.9 HYPERGLYCEMIA: ICD-10-CM

## 2024-07-30 LAB
ALBUMIN SERPL BCP-MCNC: 4.4 G/DL (ref 3.2–4.9)
ALBUMIN/GLOB SERPL: 1.2 G/DL
ALP SERPL-CCNC: 191 U/L (ref 30–99)
ALT SERPL-CCNC: 245 U/L (ref 2–50)
ANION GAP SERPL CALC-SCNC: 18 MMOL/L (ref 7–16)
APPEARANCE UR: CLEAR
AST SERPL-CCNC: 155 U/L (ref 12–45)
BILIRUB SERPL-MCNC: 0.9 MG/DL (ref 0.1–1.5)
BILIRUB UR STRIP-MCNC: NEGATIVE MG/DL
BUN SERPL-MCNC: 12 MG/DL (ref 8–22)
CALCIUM ALBUM COR SERPL-MCNC: 9.7 MG/DL (ref 8.5–10.5)
CALCIUM SERPL-MCNC: 10 MG/DL (ref 8.5–10.5)
CHLORIDE SERPL-SCNC: 94 MMOL/L (ref 96–112)
CO2 SERPL-SCNC: 22 MMOL/L (ref 20–33)
COLOR UR AUTO: YELLOW
CREAT SERPL-MCNC: 0.97 MG/DL (ref 0.5–1.4)
ERYTHROCYTE [DISTWIDTH] IN BLOOD BY AUTOMATED COUNT: 40 FL (ref 35.9–50)
EST. AVERAGE GLUCOSE BLD GHB EST-MCNC: 232 MG/DL
GFR SERPLBLD CREATININE-BSD FMLA CKD-EPI: 95 ML/MIN/1.73 M 2
GLOBULIN SER CALC-MCNC: 3.6 G/DL (ref 1.9–3.5)
GLUCOSE BLD-MCNC: 446 MG/DL (ref 65–99)
GLUCOSE SERPL-MCNC: 507 MG/DL (ref 65–99)
GLUCOSE UR STRIP.AUTO-MCNC: >=1000 MG/DL
HBA1C MFR BLD: 9.7 % (ref 4–5.6)
HCT VFR BLD AUTO: 54.6 % (ref 42–52)
HGB BLD-MCNC: 19.5 G/DL (ref 14–18)
KETONES UR STRIP.AUTO-MCNC: NEGATIVE MG/DL
LEUKOCYTE ESTERASE UR QL STRIP.AUTO: NEGATIVE
MCH RBC QN AUTO: 31.1 PG (ref 27–33)
MCHC RBC AUTO-ENTMCNC: 35.7 G/DL (ref 32.3–36.5)
MCV RBC AUTO: 87.1 FL (ref 81.4–97.8)
NITRITE UR QL STRIP.AUTO: NEGATIVE
PH UR STRIP.AUTO: 5 [PH] (ref 5–8)
PLATELET # BLD AUTO: 235 K/UL (ref 164–446)
PMV BLD AUTO: 11.3 FL (ref 9–12.9)
POTASSIUM SERPL-SCNC: 4.8 MMOL/L (ref 3.6–5.5)
PROT SERPL-MCNC: 8 G/DL (ref 6–8.2)
PROT UR QL STRIP: 100 MG/DL
RBC # BLD AUTO: 6.27 M/UL (ref 4.7–6.1)
RBC UR QL AUTO: NORMAL
SODIUM SERPL-SCNC: 134 MMOL/L (ref 135–145)
SP GR UR STRIP.AUTO: 1.01
UROBILINOGEN UR STRIP-MCNC: 0.2 MG/DL
WBC # BLD AUTO: 7.2 K/UL (ref 4.8–10.8)

## 2024-07-30 PROCEDURE — 80053 COMPREHEN METABOLIC PANEL: CPT

## 2024-07-30 PROCEDURE — 99214 OFFICE O/P EST MOD 30 MIN: CPT

## 2024-07-30 PROCEDURE — 85027 COMPLETE CBC AUTOMATED: CPT

## 2024-07-30 PROCEDURE — 83036 HEMOGLOBIN GLYCOSYLATED A1C: CPT

## 2024-07-30 PROCEDURE — 82962 GLUCOSE BLOOD TEST: CPT

## 2024-07-30 PROCEDURE — 81002 URINALYSIS NONAUTO W/O SCOPE: CPT

## 2024-07-30 NOTE — LETTER
July 30, 2024    To Whom It May Concern:         This is confirmation that Teja Michel attended his scheduled appointment with PAT Negro on 7/30/24. Please excuse him from work 7/30/24-7/31/24.          If you have any questions please do not hesitate to call me at the phone number listed below.    Sincerely,          DELGADO Negro.  [phone]

## 2024-07-31 ENCOUNTER — HOSPITAL ENCOUNTER (EMERGENCY)
Facility: MEDICAL CENTER | Age: 49
End: 2024-07-31
Attending: EMERGENCY MEDICINE
Payer: COMMERCIAL

## 2024-07-31 ENCOUNTER — TELEPHONE (OUTPATIENT)
Dept: URGENT CARE | Facility: CLINIC | Age: 49
End: 2024-07-31
Payer: COMMERCIAL

## 2024-07-31 VITALS
SYSTOLIC BLOOD PRESSURE: 142 MMHG | HEIGHT: 70 IN | BODY MASS INDEX: 34.78 KG/M2 | DIASTOLIC BLOOD PRESSURE: 69 MMHG | OXYGEN SATURATION: 95 % | WEIGHT: 242.95 LBS | RESPIRATION RATE: 18 BRPM | TEMPERATURE: 98 F | HEART RATE: 88 BPM

## 2024-07-31 DIAGNOSIS — R73.9 HYPERGLYCEMIA: ICD-10-CM

## 2024-07-31 LAB
ALBUMIN SERPL BCP-MCNC: 3.7 G/DL (ref 3.2–4.9)
ALBUMIN/GLOB SERPL: 1.2 G/DL
ALP SERPL-CCNC: 147 U/L (ref 30–99)
ALT SERPL-CCNC: 182 U/L (ref 2–50)
ANION GAP SERPL CALC-SCNC: 13 MMOL/L (ref 7–16)
AST SERPL-CCNC: 104 U/L (ref 12–45)
B-OH-BUTYR SERPL-MCNC: 0.2 MMOL/L (ref 0.02–0.27)
BASOPHILS # BLD AUTO: 1.1 % (ref 0–1.8)
BASOPHILS # BLD: 0.07 K/UL (ref 0–0.12)
BILIRUB SERPL-MCNC: 0.9 MG/DL (ref 0.1–1.5)
BUN SERPL-MCNC: 11 MG/DL (ref 8–22)
CALCIUM ALBUM COR SERPL-MCNC: 8.7 MG/DL (ref 8.5–10.5)
CALCIUM SERPL-MCNC: 8.5 MG/DL (ref 8.5–10.5)
CHLORIDE SERPL-SCNC: 98 MMOL/L (ref 96–112)
CO2 SERPL-SCNC: 21 MMOL/L (ref 20–33)
CREAT SERPL-MCNC: 0.6 MG/DL (ref 0.5–1.4)
EOSINOPHIL # BLD AUTO: 0.22 K/UL (ref 0–0.51)
EOSINOPHIL NFR BLD: 3.4 % (ref 0–6.9)
ERYTHROCYTE [DISTWIDTH] IN BLOOD BY AUTOMATED COUNT: 39.2 FL (ref 35.9–50)
EST. AVERAGE GLUCOSE BLD GHB EST-MCNC: 235 MG/DL
GFR SERPLBLD CREATININE-BSD FMLA CKD-EPI: 117 ML/MIN/1.73 M 2
GLOBULIN SER CALC-MCNC: 3.1 G/DL (ref 1.9–3.5)
GLUCOSE BLD STRIP.AUTO-MCNC: 280 MG/DL (ref 65–99)
GLUCOSE BLD STRIP.AUTO-MCNC: 317 MG/DL (ref 65–99)
GLUCOSE SERPL-MCNC: 292 MG/DL (ref 65–99)
HBA1C MFR BLD: 9.8 % (ref 4–5.6)
HCT VFR BLD AUTO: 52.3 % (ref 42–52)
HGB BLD-MCNC: 18.6 G/DL (ref 14–18)
IMM GRANULOCYTES # BLD AUTO: 0.03 K/UL (ref 0–0.11)
IMM GRANULOCYTES NFR BLD AUTO: 0.5 % (ref 0–0.9)
LYMPHOCYTES # BLD AUTO: 2.24 K/UL (ref 1–4.8)
LYMPHOCYTES NFR BLD: 34.9 % (ref 22–41)
MCH RBC QN AUTO: 31.1 PG (ref 27–33)
MCHC RBC AUTO-ENTMCNC: 35.6 G/DL (ref 32.3–36.5)
MCV RBC AUTO: 87.5 FL (ref 81.4–97.8)
MONOCYTES # BLD AUTO: 0.55 K/UL (ref 0–0.85)
MONOCYTES NFR BLD AUTO: 8.6 % (ref 0–13.4)
NEUTROPHILS # BLD AUTO: 3.3 K/UL (ref 1.82–7.42)
NEUTROPHILS NFR BLD: 51.5 % (ref 44–72)
NRBC # BLD AUTO: 0 K/UL
NRBC BLD-RTO: 0 /100 WBC (ref 0–0.2)
PLATELET # BLD AUTO: 213 K/UL (ref 164–446)
PMV BLD AUTO: 10.5 FL (ref 9–12.9)
POTASSIUM SERPL-SCNC: 4 MMOL/L (ref 3.6–5.5)
PROT SERPL-MCNC: 6.8 G/DL (ref 6–8.2)
RBC # BLD AUTO: 5.98 M/UL (ref 4.7–6.1)
SODIUM SERPL-SCNC: 132 MMOL/L (ref 135–145)
WBC # BLD AUTO: 6.4 K/UL (ref 4.8–10.8)

## 2024-07-31 PROCEDURE — 80053 COMPREHEN METABOLIC PANEL: CPT

## 2024-07-31 PROCEDURE — 82962 GLUCOSE BLOOD TEST: CPT

## 2024-07-31 PROCEDURE — 700105 HCHG RX REV CODE 258: Performed by: EMERGENCY MEDICINE

## 2024-07-31 PROCEDURE — 83036 HEMOGLOBIN GLYCOSYLATED A1C: CPT

## 2024-07-31 PROCEDURE — 36415 COLL VENOUS BLD VENIPUNCTURE: CPT

## 2024-07-31 PROCEDURE — 85025 COMPLETE CBC W/AUTO DIFF WBC: CPT

## 2024-07-31 PROCEDURE — 82010 KETONE BODYS QUAN: CPT

## 2024-07-31 PROCEDURE — 99284 EMERGENCY DEPT VISIT MOD MDM: CPT

## 2024-07-31 RX ORDER — SODIUM CHLORIDE 9 MG/ML
1000 INJECTION, SOLUTION INTRAVENOUS ONCE
Status: COMPLETED | OUTPATIENT
Start: 2024-07-31 | End: 2024-07-31

## 2024-07-31 RX ADMIN — SODIUM CHLORIDE 1000 ML: 9 INJECTION, SOLUTION INTRAVENOUS at 10:13

## 2024-07-31 ASSESSMENT — FIBROSIS 4 INDEX: FIB4 SCORE: 2.11

## 2024-07-31 NOTE — PROGRESS NOTES
Verbal consent was acquired by the patient to use [employer] ambient listening note generation during this visit   Subjective:   Teja Michel is a 49 y.o. male who presents for Nausea (X Friday, hives, nausea, off balance)      HPI:  History of Present Illness  The patient presents for evaluation of multiple medical concerns.    The patient reports experiencing nausea and imbalance, which commenced last Friday and has progressively worsened. The initial episode occurred while she was in a supine position, characterized by a sensation of the room spinning. Upon standing, she nearly fell due to a loss of balance. Currently, she does not experience the room spinning, but experiences it during ambulation and head rotation. She denies any history of vertigo or numbness in her fingertips. Her vision has been deteriorating over the past few years, necessitating a recent eye examination. She experiences blurry vision towards the end of the day, necessitating the discontinuation of her glasses. Despite not wearing glasses for several years, she has noticed an increase in her thirst and frequent urination. She denies any history of diabetes, but has been diagnosed with neuropathy by a previous physician. She denies experiencing chest pain, but describes a sensation akin to a severe hangover. Earlier today, she experienced lightheadedness upon changing positions, necessitating rest. She attributes this to her overweight status and experiencing shortness of breath. She does not take any daily medications.    SOCIAL HISTORY  She does not smoke or drink.       ROS    Medications:    Current Outpatient Medications on File Prior to Visit   Medication Sig Dispense Refill    promethazine-dextromethorphan (PROMETHAZINE-DM) 6.25-15 MG/5ML syrup Take 5 mL by mouth every evening. (Patient not taking: Reported on 7/30/2024) 118 mL 0     No current facility-administered medications on file prior to visit.     "    Allergies:   Aspirin    Problem List:   Patient Active Problem List   Diagnosis    Obesity (BMI 30-39.9)    Gastroesophageal reflux disease    Dyslipidemia    History of peptic ulcer    Dysphagia    Transaminitis    IFG (impaired fasting glucose)    Microalbuminuria        Surgical History:  Past Surgical History:   Procedure Laterality Date    APPENDECTOMY      HERNIA REPAIR Right     inguinal       Past Social Hx:   Social History     Tobacco Use    Smoking status: Former     Current packs/day: 0.00     Types: Cigarettes     Quit date: 10/14/2010     Years since quittin.8    Smokeless tobacco: Never   Vaping Use    Vaping status: Never Used   Substance Use Topics    Alcohol use: No    Drug use: No          Problem list, medications, and allergies reviewed by myself today in Epic.     Objective:     BP (!) 160/100   Pulse (!) 138   Temp 36.4 °C (97.6 °F) (Temporal)   Resp (!) 28   Ht 1.778 m (5' 10\")   Wt 111 kg (245 lb)   SpO2 95%   BMI 35.15 kg/m²     Physical Exam    Assessment/Plan:     Diagnosis and associated orders:   1. Hyperglycemia  - HEMOGLOBIN A1C; Future  - Comp Metabolic Panel; Future  - UC AMA/Refusal of Treatment  - CBC WITHOUT DIFFERENTIAL; Future  - metFORMIN (GLUCOPHAGE) 500 MG Tab; Take 1 Tablet by mouth 2 times a day with meals for 30 days.  Dispense: 60 Tablet; Refill: 0  - Referral to establish with PCP  - Referral to Endocrinology    2. Nausea  - POCT Glucose  - POCT Urinalysis  - UC AMA/Refusal of Treatment  - CBC WITHOUT DIFFERENTIAL; Future    3. Dizziness  - CBC WITHOUT DIFFERENTIAL; Future    Results for orders placed or performed in visit on 24   POCT Glucose   Result Value Ref Range    Glucose - Accu-Ck 446 (A) 65 - 99 mg/dL   POCT Urinalysis   Result Value Ref Range    POC Color Yellow Negative    POC Appearance Clear Negative    POC Glucose >=1,000 Negative mg/dL    POC Bilirubin Negative Negative mg/dL    POC Ketones Negative Negative mg/dL    POC Specific " Gravity 1.015 <1.005 - >1.030    POC Blood Trace-Intact Negative    POC Urine PH 5.0 5.0 - 8.0    POC Protein 100 Negative mg/dL    POC Urobiligen 0.2 Negative (0.2) mg/dL    POC Nitrites Negative Negative    POC Leukocyte Esterase Negative Negative       Comments/MDM:   Pt is clinically stable at today's acute urgent care visit.  No acute distress noted. Appropriate for outpatient management at this time.     Assessment & Plan    The patient's blood glucose level is currently elevated.          Discussed DDx, management options (risks,benefits, and alternatives to planned treatment), natural progression and supportive care.  Expressed understanding and the treatment plan was agreed upon. Questions were encouraged and answered   Return to urgent care prn if new or worsening sx or if there is no improvement in condition prn.    Educated in Red flags and indications to immediately call 911 or present to the Emergency Department.   Advised the patient to follow-up with the primary care physician for recheck, reevaluation, and consideration of further management.    I personally reviewed prior external notes and test results pertinent to today's visit.  I have independently reviewed and interpreted all diagnostics ordered during this urgent care acute visit.   Time spent evaluating this patient was at least *** minutes and includes preparing for visit, counseling/education, exam and evaluation, obtaining history, independent interpretation, ordering lab/test/procedures,medication management and documentation.Time does not include separately billable procedures noted .       Please note that this dictation was created using voice recognition software. I have made a reasonable attempt to correct obvious errors, but I expect that there are errors of grammar and possibly content that I did not discover before finalizing the note.    This note was electronically signed by JOSE RAUL Huston         Assessment & Plan    The patient presents today for dizziness, nausea, increased thirst, feeling imbalanced, and urinary frequency.  He does not have any underlying history of diabetes.  In clinic 446.  I have recommended that the patient go to the emergency department secondary to elevated glucose reading and his symptoms.  The patient declines.  An AMA form was reviewed by myself with the patient and signed by the patient given his declining to go to the emergency department.  An urgent referral was placed to endocrinology and PCP.  Blood was drawn in clinic to obtain hemoglobin A1c, CBC, and CHEM panel.  I have thoroughly discussed with the patient the need to go to the emergency department given his symptoms and need for blood sugar management.         Discussed DDx, management options (risks,benefits, and alternatives to planned treatment), natural progression and supportive care.  Expressed understanding and the treatment plan was agreed upon. Questions were encouraged and answered   Return to urgent care prn if new or worsening sx or if there is no improvement in condition prn.    Educated in Red flags and indications to immediately call 911 or present to the Emergency Department.   Advised the patient to follow-up with the primary care physician for recheck, reevaluation, and consideration of further management.    I personally reviewed prior external notes and test results pertinent to today's visit.  I have independently reviewed and interpreted all diagnostics ordered during this urgent care acute visit.       Please note that this dictation was created using voice recognition software. I have made a reasonable attempt to correct obvious errors, but I expect that there are errors of grammar and possibly content that I did not discover before finalizing the note.    This note was electronically signed by JOSE RAUL Huston

## 2024-07-31 NOTE — ED PROVIDER NOTES
ED Provider Note    CHIEF COMPLAINT  Chief Complaint   Patient presents with    High Blood Sugar     PT reports he was seen at  yesterday for fatigue, balance issues, nausea x 1 week. PT was found to have FSBS > 500 and was told to come to ED.        EXTERNAL RECORDS REVIEWED  Reviewed previous laboratory studies reviewed urgent care clinic visit notes from yesterday    HPI/ROS  LIMITATION TO HISTORY   None  OUTSIDE HISTORIAN(S):  None    Teja Michel is a 49 y.o. male who presents for a constellation of symptoms including fatigue increased thirst and urination not feeling well for several weeks.  The patient has no history of diabetes.  He was seen at urgent care yesterday and his blood sugar was elevated and advised to come to the ER.  He waited till this morning to come but did not fill the newly prescribed metformin last night and took his first dose at 10 PM.  He endorses fatigue polyuria and polydipsia.  No high fevers or chills.  No abdominal pain.  No night sweats, the patient does not think he is lost any weight    PAST MEDICAL HISTORY   has a past medical history of GERD (gastroesophageal reflux disease) and Hyperlipidemia.    SURGICAL HISTORY   has a past surgical history that includes appendectomy and hernia repair (Right).    FAMILY HISTORY  Family History   Problem Relation Age of Onset    Thyroid Mother     Hyperlipidemia Father     Heart Attack Father     Heart Disease Father     Hypertension Father     No Known Problems Sister     Diabetes Neg Hx        SOCIAL HISTORY  Social History     Tobacco Use    Smoking status: Former     Current packs/day: 0.00     Types: Cigarettes     Quit date: 10/14/2010     Years since quittin.8    Smokeless tobacco: Never   Vaping Use    Vaping status: Never Used   Substance and Sexual Activity    Alcohol use: No    Drug use: No    Sexual activity: Not Currently     Partners: Female       CURRENT MEDICATIONS  Home Medications       Reviewed by  "Lala Marino R.N. (Registered Nurse) on 07/31/24 at 0928  Med List Status: Partial     Medication Last Dose Status   metFORMIN (GLUCOPHAGE) 500 MG Tab  Active   promethazine-dextromethorphan (PROMETHAZINE-DM) 6.25-15 MG/5ML syrup  Active                    ALLERGIES  Allergies   Allergen Reactions    Aspirin      Facial swelling       PHYSICAL EXAM  VITAL SIGNS: BP (!) 155/100   Pulse (!) 110   Temp 36.1 °C (97 °F) (Temporal)   Resp 20   Ht 1.778 m (5' 10\")   Wt 110 kg (242 lb 15.2 oz)   SpO2 94%   BMI 34.86 kg/m²    Pulse ox interpretation: I interpret this pulse ox as normal.  Constitutional: Alert and oriented x 3, no acute distress  HEENT: Atraumatic normocephalic, pupils are equal round reactive to light extraocular movements are intact. The nares is clear, external ears are normal, mouth shows dry mucous membranes normal dentition for age  Neck: Supple, no JVD no tracheal deviation  Cardiovascular: Mild tachycardia no murmur rub or gallop 2+ pulses peripherally x4  Thorax & Lungs: No respiratory distress, no wheezes rales or rhonchi, No chest tenderness.   GI: Soft nontender nondistended positive bowel sounds, no peritoneal signs no rebound guarding or rigidity  Skin: Warm dry no acute rash or lesion  Musculoskeletal: Moving all extremities with full range and 5 of 5 strength no acute  deformity  Neurologic: Cranial nerves III through XII are grossly intact no sensory deficit no cerebellar dysfunction   Psychiatric: Appropriate affect for situation at this time          EKG/LABS  Results for orders placed or performed during the hospital encounter of 07/31/24   CBC with Differential   Result Value Ref Range    WBC 6.4 4.8 - 10.8 K/uL    RBC 5.98 4.70 - 6.10 M/uL    Hemoglobin 18.6 (H) 14.0 - 18.0 g/dL    Hematocrit 52.3 (H) 42.0 - 52.0 %    MCV 87.5 81.4 - 97.8 fL    MCH 31.1 27.0 - 33.0 pg    MCHC 35.6 32.3 - 36.5 g/dL    RDW 39.2 35.9 - 50.0 fL    Platelet Count 213 164 - 446 K/uL    MPV 10.5 " 9.0 - 12.9 fL    Neutrophils-Polys 51.50 44.00 - 72.00 %    Lymphocytes 34.90 22.00 - 41.00 %    Monocytes 8.60 0.00 - 13.40 %    Eosinophils 3.40 0.00 - 6.90 %    Basophils 1.10 0.00 - 1.80 %    Immature Granulocytes 0.50 0.00 - 0.90 %    Nucleated RBC 0.00 0.00 - 0.20 /100 WBC    Neutrophils (Absolute) 3.30 1.82 - 7.42 K/uL    Lymphs (Absolute) 2.24 1.00 - 4.80 K/uL    Monos (Absolute) 0.55 0.00 - 0.85 K/uL    Eos (Absolute) 0.22 0.00 - 0.51 K/uL    Baso (Absolute) 0.07 0.00 - 0.12 K/uL    Immature Granulocytes (abs) 0.03 0.00 - 0.11 K/uL    NRBC (Absolute) 0.00 K/uL   BETA-HYDROXYBUTYRIC ACID   Result Value Ref Range    beta-Hydroxybutyric Acid 0.20 0.02 - 0.27 mmol/L   HEMOGLOBIN A1C   Result Value Ref Range    Glycohemoglobin 9.8 (H) 4.0 - 5.6 %    Est Avg Glucose 235 mg/dL   Comp Metabolic Panel   Result Value Ref Range    Sodium 132 (L) 135 - 145 mmol/L    Potassium 4.0 3.6 - 5.5 mmol/L    Chloride 98 96 - 112 mmol/L    Co2 21 20 - 33 mmol/L    Anion Gap 13.0 7.0 - 16.0    Glucose 292 (H) 65 - 99 mg/dL    Bun 11 8 - 22 mg/dL    Creatinine 0.60 0.50 - 1.40 mg/dL    Calcium 8.5 8.5 - 10.5 mg/dL    Correct Calcium 8.7 8.5 - 10.5 mg/dL    AST(SGOT) 104 (H) 12 - 45 U/L    ALT(SGPT) 182 (H) 2 - 50 U/L    Alkaline Phosphatase 147 (H) 30 - 99 U/L    Total Bilirubin 0.9 0.1 - 1.5 mg/dL    Albumin 3.7 3.2 - 4.9 g/dL    Total Protein 6.8 6.0 - 8.2 g/dL    Globulin 3.1 1.9 - 3.5 g/dL    A-G Ratio 1.2 g/dL   ESTIMATED GFR   Result Value Ref Range    GFR (CKD-EPI) 117 >60 mL/min/1.73 m 2   POCT glucose device results   Result Value Ref Range    POC Glucose, Blood 317 (H) 65 - 99 mg/dL      I have independently interpreted this EKG          COURSE & MEDICAL DECISION MAKING    ASSESSMENT, COURSE AND PLAN  Care Narrative:     This is a very pleasant 49-year-old who presented to an urgent care yesterday with polyuria polydipsia and elevated blood sugar.  Here he appears to be a new onset type II diabetic.  His vital signs are  notable for mild tachycardia but no hypotension fever or tachypnea.  His CBC was reassuring and that there is no leukocytosis or bandemia.  Metabolic panel reveals mild hyperglycemia with a normal CO2 normal electrolytes and no evidence of ketoacidosis.  Beta hydroxybutyric acid is not elevated and hemoglobin A1c is 9.8 consistent with likely type 2 diabetes.  He was given IV fluids and blood sugar trending down to 280.  I had a long discussion with the patient regarding dietary modifications weight loss and he will be newly referred to a primary doctor.  He just started his metformin therefore I doubt that this represents treatment failure and I counseled him it might take a week or so for his body to adjust to the new medication.  If he develops any new or worsening symptoms I recommended he return immediately for new or worsening symptoms    Hydration: Based on the patient's presentation of Hyperglycemia the patient was given IV fluids. IV Hydration was used because oral hydration was not adequate alone. Upon recheck following hydration, the patient was improving.          ADDITIONAL PROBLEMS MANAGED      DISPOSITION AND DISCUSSIONS  I have discussed management of the patient with the following physicians and DANIELLE's: None    Discussion of management with other QHP or appropriate source(s): None    Escalation of care considered, and ultimately not performed: Considered admission    Barriers to care at this time, including but not limited to: Patient has no primary care doctor  Decision tools and prescription drugs considered including, but not limited to: None.    FINAL DIAGNOSIS  1. Hyperglycemia  Referral to establish with PCP             Electronically signed by: Moshe Booth M.D., 7/31/2024 10:14 AM

## 2024-07-31 NOTE — ED NOTES
Patient discharged home per ERP.  Discharge teaching and education discussed with patient. POC discussed.   Patient verbalized understanding of discharge teaching and education. Provided pt with referral number. No other questions at this time.     PIV removed.     VSS. Patient alert and oriented. Patient arranged ride for self. Able to ambulate off unit safely with steady gait.

## 2024-07-31 NOTE — ED TRIAGE NOTES
Chief Complaint   Patient presents with    High Blood Sugar     PT reports he was seen at  yesterday for fatigue, balance issues, nausea x 1 week. PT was found to have FSBS > 500 and was told to come to ED.      FSBS 317 in triage.    PT ambulatory to triage for above complaint. GCS 15.     Pt is alert and oriented, speaking in full sentences, follows commands and responds appropriately to questions. NAD. Resp are even and unlabored.      Pt placed in lobby. Pt educated on triage process. Pt encouraged to alert staff for any changes.     Patient and staff wearing appropriate PPE

## 2024-08-03 ASSESSMENT — ENCOUNTER SYMPTOMS
NAUSEA: 1
POLYDIPSIA: 1
DIZZINESS: 1

## 2024-08-06 SDOH — ECONOMIC STABILITY: TRANSPORTATION INSECURITY
IN THE PAST 12 MONTHS, HAS LACK OF TRANSPORTATION KEPT YOU FROM MEETINGS, WORK, OR FROM GETTING THINGS NEEDED FOR DAILY LIVING?: NO

## 2024-08-06 SDOH — ECONOMIC STABILITY: FOOD INSECURITY: WITHIN THE PAST 12 MONTHS, YOU WORRIED THAT YOUR FOOD WOULD RUN OUT BEFORE YOU GOT MONEY TO BUY MORE.: NEVER TRUE

## 2024-08-06 SDOH — ECONOMIC STABILITY: HOUSING INSECURITY: IN THE LAST 12 MONTHS, HOW MANY PLACES HAVE YOU LIVED?: 1

## 2024-08-06 SDOH — ECONOMIC STABILITY: HOUSING INSECURITY
IN THE LAST 12 MONTHS, WAS THERE A TIME WHEN YOU DID NOT HAVE A STEADY PLACE TO SLEEP OR SLEPT IN A SHELTER (INCLUDING NOW)?: NO

## 2024-08-06 SDOH — ECONOMIC STABILITY: INCOME INSECURITY: IN THE LAST 12 MONTHS, WAS THERE A TIME WHEN YOU WERE NOT ABLE TO PAY THE MORTGAGE OR RENT ON TIME?: NO

## 2024-08-06 SDOH — ECONOMIC STABILITY: TRANSPORTATION INSECURITY
IN THE PAST 12 MONTHS, HAS THE LACK OF TRANSPORTATION KEPT YOU FROM MEDICAL APPOINTMENTS OR FROM GETTING MEDICATIONS?: NO

## 2024-08-06 SDOH — HEALTH STABILITY: PHYSICAL HEALTH: ON AVERAGE, HOW MANY MINUTES DO YOU ENGAGE IN EXERCISE AT THIS LEVEL?: 30 MIN

## 2024-08-06 SDOH — ECONOMIC STABILITY: INCOME INSECURITY: HOW HARD IS IT FOR YOU TO PAY FOR THE VERY BASICS LIKE FOOD, HOUSING, MEDICAL CARE, AND HEATING?: NOT HARD AT ALL

## 2024-08-06 SDOH — ECONOMIC STABILITY: FOOD INSECURITY: WITHIN THE PAST 12 MONTHS, THE FOOD YOU BOUGHT JUST DIDN'T LAST AND YOU DIDN'T HAVE MONEY TO GET MORE.: NEVER TRUE

## 2024-08-06 SDOH — HEALTH STABILITY: PHYSICAL HEALTH: ON AVERAGE, HOW MANY DAYS PER WEEK DO YOU ENGAGE IN MODERATE TO STRENUOUS EXERCISE (LIKE A BRISK WALK)?: 2 DAYS

## 2024-08-06 SDOH — ECONOMIC STABILITY: TRANSPORTATION INSECURITY
IN THE PAST 12 MONTHS, HAS LACK OF RELIABLE TRANSPORTATION KEPT YOU FROM MEDICAL APPOINTMENTS, MEETINGS, WORK OR FROM GETTING THINGS NEEDED FOR DAILY LIVING?: NO

## 2024-08-06 SDOH — HEALTH STABILITY: MENTAL HEALTH
STRESS IS WHEN SOMEONE FEELS TENSE, NERVOUS, ANXIOUS, OR CAN'T SLEEP AT NIGHT BECAUSE THEIR MIND IS TROUBLED. HOW STRESSED ARE YOU?: TO SOME EXTENT

## 2024-08-06 ASSESSMENT — LIFESTYLE VARIABLES
AUDIT-C TOTAL SCORE: 0
HOW OFTEN DO YOU HAVE SIX OR MORE DRINKS ON ONE OCCASION: NEVER
SKIP TO QUESTIONS 9-10: 1
HOW MANY STANDARD DRINKS CONTAINING ALCOHOL DO YOU HAVE ON A TYPICAL DAY: PATIENT DOES NOT DRINK
HOW OFTEN DO YOU HAVE A DRINK CONTAINING ALCOHOL: NEVER

## 2024-08-06 ASSESSMENT — SOCIAL DETERMINANTS OF HEALTH (SDOH)
IN A TYPICAL WEEK, HOW MANY TIMES DO YOU TALK ON THE PHONE WITH FAMILY, FRIENDS, OR NEIGHBORS?: THREE TIMES A WEEK
HOW OFTEN DO YOU HAVE SIX OR MORE DRINKS ON ONE OCCASION: NEVER
HOW OFTEN DO YOU GET TOGETHER WITH FRIENDS OR RELATIVES?: ONCE A WEEK
HOW OFTEN DO YOU ATTENT MEETINGS OF THE CLUB OR ORGANIZATION YOU BELONG TO?: NEVER
DO YOU BELONG TO ANY CLUBS OR ORGANIZATIONS SUCH AS CHURCH GROUPS UNIONS, FRATERNAL OR ATHLETIC GROUPS, OR SCHOOL GROUPS?: NO
WITHIN THE PAST 12 MONTHS, YOU WORRIED THAT YOUR FOOD WOULD RUN OUT BEFORE YOU GOT THE MONEY TO BUY MORE: NEVER TRUE
HOW MANY DRINKS CONTAINING ALCOHOL DO YOU HAVE ON A TYPICAL DAY WHEN YOU ARE DRINKING: PATIENT DOES NOT DRINK
DO YOU BELONG TO ANY CLUBS OR ORGANIZATIONS SUCH AS CHURCH GROUPS UNIONS, FRATERNAL OR ATHLETIC GROUPS, OR SCHOOL GROUPS?: NO
HOW OFTEN DO YOU ATTEND CHURCH OR RELIGIOUS SERVICES?: NEVER
HOW OFTEN DO YOU ATTEND CHURCH OR RELIGIOUS SERVICES?: NEVER
HOW OFTEN DO YOU HAVE A DRINK CONTAINING ALCOHOL: NEVER
HOW HARD IS IT FOR YOU TO PAY FOR THE VERY BASICS LIKE FOOD, HOUSING, MEDICAL CARE, AND HEATING?: NOT HARD AT ALL
IN A TYPICAL WEEK, HOW MANY TIMES DO YOU TALK ON THE PHONE WITH FAMILY, FRIENDS, OR NEIGHBORS?: THREE TIMES A WEEK
HOW OFTEN DO YOU ATTENT MEETINGS OF THE CLUB OR ORGANIZATION YOU BELONG TO?: NEVER
HOW OFTEN DO YOU GET TOGETHER WITH FRIENDS OR RELATIVES?: ONCE A WEEK
IN THE PAST 12 MONTHS, HAS THE ELECTRIC, GAS, OIL, OR WATER COMPANY THREATENED TO SHUT OFF SERVICE IN YOUR HOME?: NO

## 2024-08-09 ENCOUNTER — RESEARCH ENCOUNTER (OUTPATIENT)
Dept: MEDICAL GROUP | Facility: CLINIC | Age: 49
End: 2024-08-09
Payer: COMMERCIAL

## 2024-08-09 ENCOUNTER — OFFICE VISIT (OUTPATIENT)
Dept: MEDICAL GROUP | Facility: CLINIC | Age: 49
End: 2024-08-09
Attending: EMERGENCY MEDICINE
Payer: COMMERCIAL

## 2024-08-09 VITALS
HEART RATE: 92 BPM | WEIGHT: 248 LBS | RESPIRATION RATE: 18 BRPM | DIASTOLIC BLOOD PRESSURE: 80 MMHG | OXYGEN SATURATION: 97 % | HEIGHT: 70 IN | SYSTOLIC BLOOD PRESSURE: 140 MMHG | BODY MASS INDEX: 35.5 KG/M2

## 2024-08-09 DIAGNOSIS — R03.0 ELEVATED BLOOD PRESSURE READING: ICD-10-CM

## 2024-08-09 DIAGNOSIS — Z12.11 ENCOUNTER FOR COLORECTAL CANCER SCREENING: ICD-10-CM

## 2024-08-09 DIAGNOSIS — E11.9 TYPE 2 DIABETES MELLITUS WITHOUT COMPLICATION, WITHOUT LONG-TERM CURRENT USE OF INSULIN (HCC): ICD-10-CM

## 2024-08-09 DIAGNOSIS — Z13.79 GENETIC SCREENING: ICD-10-CM

## 2024-08-09 DIAGNOSIS — R13.10 DYSPHAGIA, UNSPECIFIED TYPE: ICD-10-CM

## 2024-08-09 DIAGNOSIS — Z12.12 ENCOUNTER FOR COLORECTAL CANCER SCREENING: ICD-10-CM

## 2024-08-09 DIAGNOSIS — E78.5 DYSLIPIDEMIA: ICD-10-CM

## 2024-08-09 DIAGNOSIS — R74.01 TRANSAMINITIS: ICD-10-CM

## 2024-08-09 PROCEDURE — 3077F SYST BP >= 140 MM HG: CPT | Mod: GC

## 2024-08-09 PROCEDURE — 3079F DIAST BP 80-89 MM HG: CPT | Mod: GC

## 2024-08-09 PROCEDURE — 99204 OFFICE O/P NEW MOD 45 MIN: CPT | Mod: GC

## 2024-08-09 ASSESSMENT — FIBROSIS 4 INDEX: FIB4 SCORE: 1.81

## 2024-08-09 ASSESSMENT — PATIENT HEALTH QUESTIONNAIRE - PHQ9: CLINICAL INTERPRETATION OF PHQ2 SCORE: 0

## 2024-08-09 NOTE — ASSESSMENT & PLAN NOTE
Will evaluate patient for hepatitis.  He was enrolled in the [facility] study today.  Consider further workup with right upper quadrant ultrasound at next visit.

## 2024-08-09 NOTE — RESEARCH NOTE
Patient wants to read consents more carefully, so sent to patient's MyChart via [employer]. Closing this one.

## 2024-08-09 NOTE — ASSESSMENT & PLAN NOTE
Patient does have a blood pressure cuff at home.  I asked him to keep a blood pressure log which I will review at his next visit.  Counseled on appropriate way to take blood pressure.

## 2024-08-09 NOTE — ASSESSMENT & PLAN NOTE
Counseled the patient extensively on an appropriate trial of metformin.  I educated him on how metformin is first-line and very important for the treatment of type 2 diabetes.  He has only tried metformin for a handful of times.  I counseled that he may crush his metformin or will in a small amount of food to swallow.  He may start with 500 mg once once daily and go up to twice daily if he can tolerate.  Follow-up with him in 1 month to see how he is doing with the metformin.  Will also perform monofilament test and diabetic retinopathy exam at next visit.

## 2024-08-09 NOTE — PROGRESS NOTES
HPI:  Patient is a 49 y.o. male here to establish care.  Problem   Type 2 Diabetes Mellitus Without Complication, Without Long-Term Current Use of Insulin ([facility])    Patient states he was just recently diagnosed with type 2 diabetes mellitus.  He initially was at an urgent care a few weeks ago due to not feeling well.  He was told his blood sugars were high and some labs were ordered with referral to a PCP.  Then last week he again was not feeling well and went to the ER where his blood glucose was elevated at over 500.  He was then diagnosed with type 2 diabetes mellitus in the ER.  See dysphagia overview.    Since then he has been doing a lot of research into dietary and lifestyle modifications for diabetes.  He has stopped eating many simple carbs, opting for complex carbs like spinach and whole-wheat products.  He is also eating a lot of vegetables and fruits, and increasing his protein.  He also states he would be starting an exercise regimen soon.  He was also put on metformin but has only tried it a handful of times.  He reports not tolerating the metformin.  He feels burning in his stomach 4 to 5 hours after taking metformin.  Was taking it with food.    He has a blood glucose monitor and he monitors his sugars after eating.  Blood sugars have ranged in the 150s to 160s after eating.  However he does have multiple readings in the 250s as well.  A1c of 9.8 on July 31.     Elevated Blood Pressure Reading    Patient has never been diagnosed with hypertension previously.  However he does state he only ever goes to the doctor for acute visits at an urgent care.  He has no PCP.  His blood pressure was also elevated in the ER.  The blood pressure cuff at home but is not currently checking his blood pressures.     Transaminitis    Has a history of previously elevated liver function enzymes since 2020.  Has never been diagnosed with hep C or hep B.  Was tested last in 2020.  Liver function enzymes remain elevated on  "labs from his ER visit.  Denies current alcohol use.  Does state he used to party a long time ago but denies ever having a history of heavy alcohol use or alcohol dependence.  Denies any recreational drug use.     Dysphagia    He also states he has some dysphagia only with pills.  States he has at this since he was a teenager.  No trouble swallowing food or eating otherwise.  He does need to take medications he usually crushes his medications if able, or rolls it in some food and is able to swallow that way.  Reports pills get stuck in his throat and will often dissolve there.  He has been evaluated at GI before with a swallow study which was reportedly normal.                                                                                                                                     Patient Active Problem List    Diagnosis Date Noted    Type 2 diabetes mellitus without complication, without long-term current use of insulin ([facility]) 08/09/2024    Elevated blood pressure reading 08/09/2024    Transaminitis 11/02/2020    IFG (impaired fasting glucose) 11/02/2020    Microalbuminuria 11/02/2020    Obesity (BMI 30-39.9) 10/14/2020    Gastroesophageal reflux disease 10/14/2020    Dyslipidemia 10/14/2020    History of peptic ulcer 10/14/2020    Dysphagia 10/14/2020       Current Outpatient Medications   Medication Sig Dispense Refill    metFORMIN (GLUCOPHAGE) 500 MG Tab Take 1 Tablet by mouth 2 times a day with meals for 30 days. 60 Tablet 0     No current facility-administered medications for this visit.         Allergies as of 08/09/2024 - Reviewed 07/31/2024   Allergen Reaction Noted    Aspirin  06/25/2019          BP (!) 140/80 (BP Location: Left arm, Patient Position: Sitting, BP Cuff Size: Adult)   Pulse 92   Resp 18   Ht 1.778 m (5' 10\")   Wt 112 kg (248 lb)   SpO2 97%   BMI 35.58 kg/m²     Gen: no fevers/chills, no changes in weight  Eyes: no changes in vision  ENT: no sore throat, no hearing loss, no " bloody nose  Pulm: no sob, no cough  CV: no chest pain, no palpitations  GI: no nausea/vomiting, no diarrhea  : no dysuria or flank pain  MSk: no myalgias  Skin: no rash  Psych: no change in mood   Neuro: no headaches, no numbness/tingling  Heme/Lymph: no easy bruising or bleeding    Physical Exam:  Gen:         Alert and oriented, No apparent distress.  Neck:        No Lymphadenopathy or Bruits.  Lungs:     Clear to auscultation bilaterally  CV:           Regular rate and rhythm. No murmurs, rubs or gallops.    Abdomen: soft, nontender, nondistended.    Skin:      no rash or exudate             Ext:          No clubbing, cyanosis, edema.      Assessment and Plan    49 y.o. male with the following-  Problem List Items Addressed This Visit       Dyslipidemia    Relevant Orders    Lipid Profile    Dysphagia     Referral to speech for evaluation and treatment of his dysphagia.         Relevant Orders    Referral to Speech Therapy    Transaminitis     Will evaluate patient for hepatitis.  He was enrolled in the Cox study today.  Consider further workup with right upper quadrant ultrasound at next visit.         Relevant Orders    HEP C VIRUS ANTIBODY    HEP B SURFACE AB    Type 2 diabetes mellitus without complication, without long-term current use of insulin (HCC)     Counseled the patient extensively on an appropriate trial of metformin.  I educated him on how metformin is first-line and very important for the treatment of type 2 diabetes.  He has only tried metformin for a handful of times.  I counseled that he may crush his metformin or will in a small amount of food to swallow.  He may start with 500 mg once once daily and go up to twice daily if he can tolerate.  Follow-up with him in 1 month to see how he is doing with the metformin.  Will also perform monofilament test and diabetic retinopathy exam at next visit.         Relevant Orders    Referral to Diabetic Education    Lipid Profile    MICROALBUMIN CREAT  RATIO URINE    Elevated blood pressure reading     Patient does have a blood pressure cuff at home.  I asked him to keep a blood pressure log which I will review at his next visit.  Counseled on appropriate way to take blood pressure.          Other Visit Diagnoses       Encounter for colorectal cancer screening        Patient prefers Cologuard testing over colonoscopy.    Relevant Orders    COLOGUARD (FIT DNA)          Return in about 4 weeks (around 9/6/2024).    Omar Weiss MD, PGY1

## 2024-09-08 ENCOUNTER — OFFICE VISIT (OUTPATIENT)
Dept: URGENT CARE | Facility: CLINIC | Age: 49
End: 2024-09-08
Payer: COMMERCIAL

## 2024-09-08 ENCOUNTER — TELEPHONE (OUTPATIENT)
Dept: URGENT CARE | Facility: CLINIC | Age: 49
End: 2024-09-08

## 2024-09-08 VITALS
SYSTOLIC BLOOD PRESSURE: 128 MMHG | BODY MASS INDEX: 33.44 KG/M2 | HEART RATE: 117 BPM | DIASTOLIC BLOOD PRESSURE: 92 MMHG | OXYGEN SATURATION: 96 % | RESPIRATION RATE: 12 BRPM | TEMPERATURE: 96.6 F | WEIGHT: 233.6 LBS | HEIGHT: 70 IN

## 2024-09-08 DIAGNOSIS — U07.1 COVID-19: ICD-10-CM

## 2024-09-08 DIAGNOSIS — R68.89 FLU-LIKE SYMPTOMS: ICD-10-CM

## 2024-09-08 LAB
FLUAV RNA SPEC QL NAA+PROBE: NEGATIVE
FLUBV RNA SPEC QL NAA+PROBE: NEGATIVE
RSV RNA SPEC QL NAA+PROBE: NEGATIVE
SARS-COV-2 RNA RESP QL NAA+PROBE: POSITIVE

## 2024-09-08 PROCEDURE — 99213 OFFICE O/P EST LOW 20 MIN: CPT | Performed by: NURSE PRACTITIONER

## 2024-09-08 PROCEDURE — 0241U POCT CEPHEID COV-2, FLU A/B, RSV - PCR: CPT | Performed by: NURSE PRACTITIONER

## 2024-09-08 PROCEDURE — 3074F SYST BP LT 130 MM HG: CPT | Performed by: NURSE PRACTITIONER

## 2024-09-08 PROCEDURE — 3080F DIAST BP >= 90 MM HG: CPT | Performed by: NURSE PRACTITIONER

## 2024-09-08 ASSESSMENT — FIBROSIS 4 INDEX: FIB4 SCORE: 1.81

## 2024-09-08 NOTE — LETTER
September 8, 2024       Patient: Teja Michel   YOB: 1975   Date of Visit: 9/8/2024         To Whom It May Concern:    In my medical opinion, I recommend that Teja Michel be excused from work tomorrow, 9/9/2024 due to illness.     If you have any questions or concerns, please don't hesitate to call [phone]          Sincerely,          SUPA Perez.P.R.N.  Electronically Signed

## 2024-09-09 NOTE — TELEPHONE ENCOUNTER
Pt called, requesting a dr's note for the rest of the week due to positive for covid. Please assist, thank you.

## 2024-09-12 ASSESSMENT — ENCOUNTER SYMPTOMS
NEUROLOGICAL NEGATIVE: 1
SORE THROAT: 0
EYES NEGATIVE: 1
SINUS PRESSURE: 1
COUGH: 1
HEMOPTYSIS: 0
CONSTITUTIONAL NEGATIVE: 1
CHILLS: 0
WHEEZING: 0
CARDIOVASCULAR NEGATIVE: 1
FEVER: 0
GASTROINTESTINAL NEGATIVE: 1
HOARSE VOICE: 0
SPUTUM PRODUCTION: 0
MUSCULOSKELETAL NEGATIVE: 1
SHORTNESS OF BREATH: 0
PSYCHIATRIC NEGATIVE: 1

## 2024-11-14 ENCOUNTER — OFFICE VISIT (OUTPATIENT)
Dept: URGENT CARE | Facility: CLINIC | Age: 49
End: 2024-11-14
Payer: COMMERCIAL

## 2024-11-14 VITALS
RESPIRATION RATE: 14 BRPM | WEIGHT: 226.4 LBS | OXYGEN SATURATION: 97 % | HEART RATE: 99 BPM | BODY MASS INDEX: 32.41 KG/M2 | HEIGHT: 70 IN | TEMPERATURE: 97.5 F | DIASTOLIC BLOOD PRESSURE: 74 MMHG | SYSTOLIC BLOOD PRESSURE: 114 MMHG

## 2024-11-14 DIAGNOSIS — B34.9 NONSPECIFIC SYNDROME SUGGESTIVE OF VIRAL ILLNESS: ICD-10-CM

## 2024-11-14 DIAGNOSIS — R19.7 DIARRHEA, UNSPECIFIED TYPE: ICD-10-CM

## 2024-11-14 PROCEDURE — 3078F DIAST BP <80 MM HG: CPT

## 2024-11-14 PROCEDURE — 3074F SYST BP LT 130 MM HG: CPT

## 2024-11-14 PROCEDURE — 99213 OFFICE O/P EST LOW 20 MIN: CPT

## 2024-11-14 ASSESSMENT — FIBROSIS 4 INDEX: FIB4 SCORE: 1.81

## 2024-11-14 ASSESSMENT — ENCOUNTER SYMPTOMS
DIARRHEA: 1
FEVER: 0
CHILLS: 0
ABDOMINAL PAIN: 0

## 2024-11-14 NOTE — LETTER
November 14, 2024    To Whom It May Concern:         This is confirmation that Teja Michel attended his scheduled appointment with PAT Gallo on 11/14/24.  May return to work once he is without fever for 24 hours and feeling progressive improvement.         If you have any questions please do not hesitate to call me at the phone number listed below.    Sincerely,          JAHAIRA GalloPMarianRMarianN.  [phone]

## 2024-11-14 NOTE — LETTER
November 14, 2024    To Whom It May Concern:         This is confirmation that Teja Michel attended his scheduled appointment with PAT Gallo on 11/14/24.         If you have any questions please do not hesitate to call me at the phone number listed below.    Sincerely,          DELGADO Gallo.  [phone]

## 2024-11-15 NOTE — PROGRESS NOTES
CHIEF COMPLAINT  Chief Complaint   Patient presents with    Cough     Pt has a cough, diarrhea, body aches, fatigue x yesterday. Pt needs a Dr. Note for work.      Subjective:   Teja Michel is a 49 y.o. male who presents to urgent care with concerns for cough, body aches, fatigue x 2 days.  Patient also reports symptoms of diarrhea.  Denies any abdominal pain.  No nausea or vomiting.  Denies any symptoms of shortness of breath.  No sore throat.  No known symptoms of fever or chills.  No other pertinent past medical history.  He is requiring a note for work.      Review of Systems   Constitutional:  Positive for malaise/fatigue. Negative for chills and fever.   HENT:  Positive for congestion.    Gastrointestinal:  Positive for diarrhea. Negative for abdominal pain.       PAST MEDICAL HISTORY  Patient Active Problem List    Diagnosis Date Noted    Type 2 diabetes mellitus without complication, without long-term current use of insulin ([facility]) 2024    Elevated blood pressure reading 2024    Transaminitis 2020    IFG (impaired fasting glucose) 2020    Microalbuminuria 2020    Obesity (BMI 30-39.9) 10/14/2020    Gastroesophageal reflux disease 10/14/2020    Dyslipidemia 10/14/2020    History of peptic ulcer 10/14/2020    Dysphagia 10/14/2020       SURGICAL HISTORY   has a past surgical history that includes appendectomy and hernia repair (Right).    ALLERGIES  Allergies   Allergen Reactions    Aspirin      Facial swelling       CURRENT MEDICATIONS  Home Medications       Reviewed by Omer Rico Ass't (Medical Assistant) on 24 at 1944  Med List Status: <None>     Medication Last Dose Status   metFORMIN (GLUCOPHAGE) 500 MG Tab Not Taking Active                    SOCIAL HISTORY  Social History     Tobacco Use    Smoking status: Former     Current packs/day: 0.00     Types: Cigarettes     Quit date: 10/14/2010     Years since quittin.0    Smokeless  "tobacco: Never   Vaping Use    Vaping status: Never Used   Substance and Sexual Activity    Alcohol use: No    Drug use: No    Sexual activity: Not Currently     Partners: Female       FAMILY HISTORY  Family History   Problem Relation Age of Onset    Thyroid Mother     Hyperlipidemia Father     Heart Attack Father     Heart Disease Father     Hypertension Father     No Known Problems Sister     Diabetes Neg Hx          Medications, Allergies, and current problem list reviewed today in Epic.     Objective:     /74 (BP Location: Left arm, Patient Position: Sitting, BP Cuff Size: Large adult)   Pulse 99   Temp 36.4 °C (97.5 °F) (Temporal)   Resp 14   Ht 1.778 m (5' 10\")   Wt 103 kg (226 lb 6.4 oz)   SpO2 97%     Physical Exam  Vitals reviewed.   Constitutional:       General: He is not in acute distress.     Appearance: Normal appearance. He is normal weight. He is not ill-appearing or toxic-appearing.   HENT:      Head: Normocephalic.      Right Ear: Tympanic membrane normal.      Left Ear: Tympanic membrane normal.      Nose: Nose normal.      Mouth/Throat:      Mouth: Mucous membranes are moist.      Pharynx: Oropharynx is clear. No oropharyngeal exudate or posterior oropharyngeal erythema.   Cardiovascular:      Rate and Rhythm: Normal rate and regular rhythm.      Pulses: Normal pulses.      Heart sounds: Normal heart sounds.   Pulmonary:      Effort: Pulmonary effort is normal. No respiratory distress.      Breath sounds: Normal breath sounds. No stridor. No wheezing, rhonchi or rales.   Musculoskeletal:      Cervical back: Normal range of motion and neck supple. No rigidity.   Lymphadenopathy:      Cervical: No cervical adenopathy.   Skin:     General: Skin is warm.      Capillary Refill: Capillary refill takes less than 2 seconds.   Neurological:      General: No focal deficit present.      Mental Status: He is alert.   Psychiatric:         Mood and Affect: Mood normal.         Assessment/Plan: "     Diagnosis and associated orders:     1. Nonspecific syndrome suggestive of viral illness        2. Diarrhea, unspecified type           Comments/MDM:     Patient presents urgent care today history of cold and flulike symptoms.  Requesting a work note.  On his exam patient is alert apparent signs of distress.  He is nontoxic-appearing.  He is clear to auscultation bilaterally.  No crackles, rhonchi or wheezes appreciated.  Normal respiratory effort.  Vital signs are stable in clinic.  Discussed appropriate OTC and supportive measures.  Advised on clear/bland diet to aid in bowel rest with any symptoms of diarrhea, nausea or vomiting.  Declines viral testing today in clinic.  Red flag signs and symptoms discussed.  Instructed to return to ER urgent care if symptoms worsen or fail to resolve.         Differential diagnosis, natural history, supportive care, and indications for immediate follow-up discussed.    Advised the patient to follow-up with the primary care physician for recheck, reevaluation, and consideration of further management.    Please note that this dictation was created using voice recognition software. I have made a reasonable attempt to correct obvious errors, but I expect that there are errors of grammar and possibly content that I did not discover before finalizing the note.    This note was electronically signed by PAT Gallo

## 2024-12-17 ENCOUNTER — OFFICE VISIT (OUTPATIENT)
Dept: URGENT CARE | Facility: CLINIC | Age: 49
End: 2024-12-17
Payer: COMMERCIAL

## 2024-12-17 VITALS
BODY MASS INDEX: 32.81 KG/M2 | HEIGHT: 70 IN | OXYGEN SATURATION: 97 % | DIASTOLIC BLOOD PRESSURE: 88 MMHG | SYSTOLIC BLOOD PRESSURE: 144 MMHG | HEART RATE: 84 BPM | RESPIRATION RATE: 14 BRPM | TEMPERATURE: 98.6 F | WEIGHT: 229.2 LBS

## 2024-12-17 DIAGNOSIS — G44.209 ACUTE NON INTRACTABLE TENSION-TYPE HEADACHE: ICD-10-CM

## 2024-12-17 DIAGNOSIS — R52 BODY ACHES: ICD-10-CM

## 2024-12-17 PROCEDURE — 99213 OFFICE O/P EST LOW 20 MIN: CPT | Performed by: STUDENT IN AN ORGANIZED HEALTH CARE EDUCATION/TRAINING PROGRAM

## 2024-12-17 PROCEDURE — 3077F SYST BP >= 140 MM HG: CPT | Performed by: STUDENT IN AN ORGANIZED HEALTH CARE EDUCATION/TRAINING PROGRAM

## 2024-12-17 PROCEDURE — 3079F DIAST BP 80-89 MM HG: CPT | Performed by: STUDENT IN AN ORGANIZED HEALTH CARE EDUCATION/TRAINING PROGRAM

## 2024-12-17 ASSESSMENT — FIBROSIS 4 INDEX: FIB4 SCORE: 1.81

## 2024-12-17 NOTE — LETTER
December 17, 2024    To Whom It May Concern:         This is confirmation that Teja Michel attended his scheduled appointment with Kenyon Santillan P.A.-C. on 12/17/24.  Excuse from work on 12/17/2024 through 12/20/2024.         If you have any questions please do not hesitate to call me at the phone number listed below.    Sincerely,          Kenyon Santillan P.A.-C.  [phone]

## 2024-12-18 NOTE — PROGRESS NOTES
"Subjective:   Teja Michel is a 49 y.o. male who presents for Headache (Woke up with (B) calves hurting, nauseous, feels off balance, started last night )      HPI:  49-year-old male presents to urgent care for headache that started yesterday, body aches, and fatigue.  States he has been under a lot of stress and thinks the headache may be due to that.  He has not had any vomiting, diarrhea, sore throat, nasal congestion, runny nose, cough, chest pain, shortness of breath.    Medications:    metFORMIN Tabs    Allergies: Patient has no active allergies.    Problem List: Teja Michel does not have any pertinent problems on file.    Surgical History:  Past Surgical History:   Procedure Laterality Date    APPENDECTOMY      HERNIA REPAIR Right     inguinal       Past Social Hx: Teja Michel  reports that he quit smoking about 14 years ago. His smoking use included cigarettes. He has never used smokeless tobacco. He reports that he does not drink alcohol and does not use drugs.     Past Family Hx:  Teja Michel family history includes Heart Attack in his father; Heart Disease in his father; Hyperlipidemia in his father; Hypertension in his father; No Known Problems in his sister; Thyroid in his mother.     Problem list, medications, and allergies reviewed by myself today in Epic.     Objective:     BP (!) 144/88 (BP Location: Left arm, Patient Position: Sitting, BP Cuff Size: Large adult long)   Pulse 84   Temp 37 °C (98.6 °F) (Temporal)   Resp 14   Ht 1.778 m (5' 10\")   Wt 104 kg (229 lb 3.2 oz)   SpO2 97%   BMI 32.89 kg/m²     Physical Exam  Vitals reviewed.   HENT:      Right Ear: Tympanic membrane, ear canal and external ear normal.      Left Ear: Tympanic membrane, ear canal and external ear normal.      Nose: Nose normal. No congestion or rhinorrhea.      Mouth/Throat:      Mouth: Mucous membranes are moist.      Pharynx: No oropharyngeal exudate " or posterior oropharyngeal erythema.   Cardiovascular:      Rate and Rhythm: Normal rate and regular rhythm.      Pulses: Normal pulses.      Heart sounds: Normal heart sounds. No murmur heard.  Pulmonary:      Effort: Pulmonary effort is normal. No respiratory distress.      Breath sounds: Normal breath sounds. No stridor. No wheezing, rhonchi or rales.   Lymphadenopathy:      Cervical: No cervical adenopathy.         Assessment/Plan:     Diagnosis and associated orders:     1. Acute non intractable tension-type headache        2. Body aches           Comments/MDM:     Patient presents today with acute onset headache and what appears to be bodyaches.  Is also experiencing some fatigue.  This could be potentially headache versus migraine.  Could also be an early presentation of viral infection.  No focal neurological deficits.  No red flag signs seen today.  No emergent findings.  Patient offered Toradol injection but declined.  May use OTC medication for his headache.  Continue to monitor symptoms return if symptoms worsen.         Differential diagnosis, natural history, supportive care, and indications for immediate follow-up discussed.    Advised the patient to follow-up with the primary care physician for recheck, reevaluation, and consideration of further management.    Please note that this dictation was created using voice recognition software. I have made a reasonable attempt to correct obvious errors, but I expect that there are errors of grammar and possibly content that I did not discover before finalizing the note.    Electronically signed by Kenyon Santillan PA-C.

## 2025-03-21 DIAGNOSIS — E11.9 TYPE 2 DIABETES MELLITUS WITHOUT COMPLICATION, WITHOUT LONG-TERM CURRENT USE OF INSULIN (HCC): ICD-10-CM

## 2025-03-26 ENCOUNTER — OFFICE VISIT (OUTPATIENT)
Dept: URGENT CARE | Facility: CLINIC | Age: 50
End: 2025-03-26
Payer: COMMERCIAL

## 2025-03-26 VITALS
WEIGHT: 237 LBS | DIASTOLIC BLOOD PRESSURE: 92 MMHG | SYSTOLIC BLOOD PRESSURE: 142 MMHG | TEMPERATURE: 97.4 F | OXYGEN SATURATION: 95 % | RESPIRATION RATE: 14 BRPM | BODY MASS INDEX: 33.93 KG/M2 | HEIGHT: 70 IN | HEART RATE: 98 BPM

## 2025-03-26 DIAGNOSIS — R19.7 DIARRHEA, UNSPECIFIED TYPE: ICD-10-CM

## 2025-03-26 PROCEDURE — 3080F DIAST BP >= 90 MM HG: CPT | Performed by: PHYSICIAN ASSISTANT

## 2025-03-26 PROCEDURE — 3077F SYST BP >= 140 MM HG: CPT | Performed by: PHYSICIAN ASSISTANT

## 2025-03-26 PROCEDURE — 99213 OFFICE O/P EST LOW 20 MIN: CPT | Performed by: PHYSICIAN ASSISTANT

## 2025-03-26 RX ORDER — LOPERAMIDE HYDROCHLORIDE 2 MG/1
2 CAPSULE ORAL 4 TIMES DAILY PRN
Qty: 30 CAPSULE | Refills: 0 | Status: SHIPPED | OUTPATIENT
Start: 2025-03-26

## 2025-03-26 ASSESSMENT — ENCOUNTER SYMPTOMS
VOMITING: 0
CHILLS: 0
NAUSEA: 0
DIARRHEA: 1
ABDOMINAL PAIN: 0
FEVER: 0
HEADACHES: 1
BLOOD IN STOOL: 0

## 2025-03-26 ASSESSMENT — FIBROSIS 4 INDEX: FIB4 SCORE: 1.81

## 2025-03-26 NOTE — LETTER
[facility]  [address] [address]     March 26, 2025    Patient: Teja Michel   YOB: 1975   Date of Visit: 3/26/2025       To Whom It May Concern:    Teja Michel was seen and treated in our department on 3/26/2025.  He should be excused from missed work for today and tomorrow.    Sincerely,     Mesfin Burleson P.A.-C.

## 2025-03-27 ENCOUNTER — TELEPHONE (OUTPATIENT)
Dept: HEALTH INFORMATION MANAGEMENT | Facility: OTHER | Age: 50
End: 2025-03-27
Payer: COMMERCIAL

## 2025-03-27 NOTE — PROGRESS NOTES
"Subjective:   Teja Michel  is a 49 y.o. male who presents for Diarrhea (Fatigue, Headache x2 days)      Diarrhea   This is a new problem. The current episode started yesterday. Associated symptoms include headaches. Pertinent negatives include no abdominal pain, chills, fever or vomiting.   Patient presents urgent care noting diarrhea onset yesterday.  Describes multiple episodes of diarrhea through the day yesterday and 2-3 today.  Patient denies blood per stool.  Denies fevers but mentions he had some hot and cold swings yesterday.  Denies abdominal pain but feels rundown.  Patient describes some headache secondary to dehydration.  Denies vomiting but complains of mild nausea.  Denies suspected sick contacts.  Denies suspected food sources.  Denies recent travel, antibiotics or preparing water from outdoor sources.  Has tried no treatments.  Requests a work excuse note.    Review of Systems   Constitutional:  Positive for malaise/fatigue. Negative for chills and fever.   Gastrointestinal:  Positive for diarrhea. Negative for abdominal pain, blood in stool, melena, nausea and vomiting.   Neurological:  Positive for headaches.       Allergies   Allergen Reactions    Aspirin Swelling     Facial swelling        Objective:   BP (!) 142/92   Pulse 98   Temp 36.3 °C (97.4 °F) (Temporal)   Resp 14   Ht 1.778 m (5' 10\")   Wt 108 kg (237 lb)   SpO2 95%   BMI 34.01 kg/m²     Physical Exam  Vitals and nursing note reviewed.   Constitutional:       General: He is not in acute distress.     Appearance: Normal appearance. He is well-developed. He is not diaphoretic.   HENT:      Head: Normocephalic and atraumatic.      Right Ear: External ear normal.      Left Ear: External ear normal.      Nose: Nose normal.      Mouth/Throat:      Mouth: Mucous membranes are moist.      Pharynx: Uvula midline.   Eyes:      General: No scleral icterus.        Right eye: No discharge.         Left eye: No discharge.      " Conjunctiva/sclera: Conjunctivae normal.   Pulmonary:      Effort: Pulmonary effort is normal. No respiratory distress.   Abdominal:      General: Bowel sounds are increased. There is no distension.      Palpations: Abdomen is soft. Abdomen is not rigid.      Tenderness: There is no abdominal tenderness. There is no right CVA tenderness, left CVA tenderness, guarding or rebound. Negative signs include Rose's sign and McBurney's sign.   Musculoskeletal:         General: Normal range of motion.      Cervical back: Neck supple.   Lymphadenopathy:      Cervical: No cervical adenopathy.   Skin:     General: Skin is warm and dry.      Coloration: Skin is not pale.   Neurological:      Mental Status: He is alert and oriented to person, place, and time.      Coordination: Coordination normal.         Assessment/Plan:   1. Diarrhea, unspecified type  - loperamide (IMODIUM) 2 MG Cap; Take 1 Capsule by mouth 4 times a day as needed for Diarrhea.  Dispense: 30 Capsule; Refill: 0  Supportive care is reviewed with patient/caregiver - recommend to push PO fluids and electrolytes, fluid resuscitation with electrolyte beverages reviewed, advance brat diet, Imodium sent to pharmacy, red flag symptoms regarding duration and nature of diarrhea reviewed with patient he is encouraged to return to clinic, sent with work excuse note  Return to clinic with lack of resolution or progression of symptoms.      I have worn an N95 mask, gloves and eye protection for the entire encounter with this patient.     Differential diagnosis, natural history, supportive care, and indications for immediate follow-up discussed.

## 2025-05-12 ENCOUNTER — OFFICE VISIT (OUTPATIENT)
Dept: URGENT CARE | Facility: PHYSICIAN GROUP | Age: 50
End: 2025-05-12
Payer: COMMERCIAL

## 2025-05-12 VITALS
OXYGEN SATURATION: 98 % | RESPIRATION RATE: 14 BRPM | BODY MASS INDEX: 34.07 KG/M2 | HEART RATE: 112 BPM | TEMPERATURE: 96.6 F | SYSTOLIC BLOOD PRESSURE: 148 MMHG | WEIGHT: 238 LBS | DIASTOLIC BLOOD PRESSURE: 98 MMHG | HEIGHT: 70 IN

## 2025-05-12 DIAGNOSIS — R51.9 ACUTE NONINTRACTABLE HEADACHE, UNSPECIFIED HEADACHE TYPE: ICD-10-CM

## 2025-05-12 DIAGNOSIS — R03.0 ELEVATED BLOOD PRESSURE READING: ICD-10-CM

## 2025-05-12 PROCEDURE — 3077F SYST BP >= 140 MM HG: CPT | Performed by: FAMILY MEDICINE

## 2025-05-12 PROCEDURE — 3080F DIAST BP >= 90 MM HG: CPT | Performed by: FAMILY MEDICINE

## 2025-05-12 PROCEDURE — 99214 OFFICE O/P EST MOD 30 MIN: CPT | Performed by: FAMILY MEDICINE

## 2025-05-12 RX ORDER — SUMATRIPTAN 20 MG/1
1 SPRAY NASAL PRN
Qty: 1 EACH | Refills: 1 | Status: SHIPPED | OUTPATIENT
Start: 2025-05-12

## 2025-05-12 ASSESSMENT — FIBROSIS 4 INDEX: FIB4 SCORE: 1.85

## 2025-05-12 ASSESSMENT — ENCOUNTER SYMPTOMS
MYALGIAS: 0
VOMITING: 0
EYE REDNESS: 0
WEIGHT LOSS: 0
EYE DISCHARGE: 0

## 2025-05-12 NOTE — LETTER
May 12, 2025         Patient: Teja Mihcel   YOB: 1975   Date of Visit: 5/12/2025           To Whom it May Concern:    Teja Michel was seen in my clinic on 5/12/2025. Please excuse from work 5/12 and 5/13/2025.       Sincerely,           Vj Samuel M.D.  Electronically Signed

## 2025-05-13 NOTE — PROGRESS NOTES
"Subjective     Teja Michel is a 50 y.o. male who presents with Headache (Severe throbbing headache X 1 am this morning )            This is a recurrent problem. Onset early this morning right sided headache. Throbbing. Nausea and photophobia earlier have improved. Current severity 6/10. PMH headaches have last longer over the last 2 years. FH migraines/father. No vision loss. No unilateral weakness. No fever. No neck stiffness. He has had increase in life stressors recently. No other aggravating or alleviating factors.          Review of Systems   Constitutional:  Negative for malaise/fatigue and weight loss.   Eyes:  Negative for discharge and redness.   Gastrointestinal:  Negative for vomiting.   Musculoskeletal:  Negative for joint pain and myalgias.   Skin:  Negative for itching and rash.              Objective     BP (!) 148/98 (BP Location: Left arm, Patient Position: Sitting, BP Cuff Size: Adult long)   Pulse (!) 112   Temp 35.9 °C (96.6 °F) (Temporal)   Resp 14   Ht 1.778 m (5' 10\")   Wt 108 kg (238 lb)   SpO2 98%   BMI 34.15 kg/m²      Physical Exam  Constitutional:       General: He is not in acute distress.     Appearance: He is well-developed.   HENT:      Head: Normocephalic and atraumatic.      Right Ear: Tympanic membrane normal.      Left Ear: Tympanic membrane normal.      Nose: No congestion.      Mouth/Throat:      Mouth: Mucous membranes are moist.      Pharynx: No posterior oropharyngeal erythema.   Eyes:      Conjunctiva/sclera: Conjunctivae normal.   Cardiovascular:      Rate and Rhythm: Normal rate and regular rhythm.      Heart sounds: Normal heart sounds. No murmur heard.  Pulmonary:      Effort: Pulmonary effort is normal.      Breath sounds: Normal breath sounds. No wheezing.   Musculoskeletal:      Cervical back: Neck supple. No rigidity.   Skin:     General: Skin is warm and dry.      Findings: No rash.   Neurological:      General: No focal deficit present.      " Mental Status: He is alert and oriented to person, place, and time.      Gait: Gait normal.         Urgent care visit 12/17/2024 reviewed    1. Acute nonintractable headache, unspecified headache type  sumatriptan (IMITREX) 20 MG/ACT nasal spray      2. Elevated blood pressure reading          Differential diagnosis, natural history, supportive care, and indications for immediate follow-up were discussed.     Ddx includes tension type HA, and blood pressure associated HA.     Given FH migraine and some traits consistent with this will try triptan.    Recommend HA and BP log prior to primary care follow up.

## 2025-05-19 ENCOUNTER — OFFICE VISIT (OUTPATIENT)
Dept: URGENT CARE | Facility: CLINIC | Age: 50
End: 2025-05-19
Payer: COMMERCIAL

## 2025-05-19 VITALS
OXYGEN SATURATION: 96 % | WEIGHT: 233 LBS | HEART RATE: 119 BPM | BODY MASS INDEX: 33.43 KG/M2 | DIASTOLIC BLOOD PRESSURE: 88 MMHG | TEMPERATURE: 97.9 F | SYSTOLIC BLOOD PRESSURE: 130 MMHG | RESPIRATION RATE: 20 BRPM

## 2025-05-19 DIAGNOSIS — E11.9 TYPE 2 DIABETES MELLITUS WITHOUT COMPLICATION, WITHOUT LONG-TERM CURRENT USE OF INSULIN (HCC): ICD-10-CM

## 2025-05-19 DIAGNOSIS — M79.674 PAIN IN TOES OF BOTH FEET: ICD-10-CM

## 2025-05-19 DIAGNOSIS — M79.675 PAIN IN TOES OF BOTH FEET: ICD-10-CM

## 2025-05-19 DIAGNOSIS — T14.8XXD DELAYED WOUND HEALING: Primary | ICD-10-CM

## 2025-05-19 PROCEDURE — 3075F SYST BP GE 130 - 139MM HG: CPT

## 2025-05-19 PROCEDURE — 3079F DIAST BP 80-89 MM HG: CPT

## 2025-05-19 PROCEDURE — 99214 OFFICE O/P EST MOD 30 MIN: CPT

## 2025-05-19 RX ORDER — CEPHALEXIN 500 MG/1
500 CAPSULE ORAL 3 TIMES DAILY
Qty: 15 CAPSULE | Refills: 0 | Status: SHIPPED | OUTPATIENT
Start: 2025-05-19 | End: 2025-05-24

## 2025-05-19 ASSESSMENT — ENCOUNTER SYMPTOMS: FEVER: 0

## 2025-05-19 ASSESSMENT — FIBROSIS 4 INDEX: FIB4 SCORE: 1.85

## 2025-05-19 NOTE — LETTER
May 19, 2025    To Whom It May Concern:         This is confirmation that Teja Michel attended his scheduled appointment with PAT Negro on 5/19/25. Please excuse him from work 5/19/25/-5/21/25.          If you have any questions please do not hesitate to call me at the phone number listed below.    Sincerely,          DELGADO Negro.  [phone]

## 2025-05-20 NOTE — PROGRESS NOTES
"Verbal consent was acquired by the patient to use [employer] ambient listening note generation during this visit   Subjective:   Teja Michel is a 50 y.o. male who presents for Foot Pain (H/O ingrown removal surgery, \"a lot going on with feet/toes\" )      HPI:  History of Present Illness  The patient is a 50-year-old male who presents for evaluation of foot pain.    He reports persistent foot pain following surgery for ingrown toenails on both sides of his great toes, performed on 04/22/2025. Despite being cleared to return to work the next day, he experienced difficulty walking and discomfort when wearing his steel-toed work shoes, which bend at the end of the toe. He was subsequently diagnosed with an infection in the postoperative week and prescribed antibiotics, which he completed on 05/05/2025. A follow-up visit two weeks post-surgery revealed slow healing, potentially due to his diabetes. He has been compensating his gait at work, leading to further foot damage. He experiences severe foot pain upon returning home from work, particularly in his toes due to shoe pressure. The soles of his feet are also painful, although less so when he is not working. He reports numbness and tingling in his toes, and a sensation of heat in his feet. He describes an incident last night where he felt a sharp, needle-like pain in one toe, which recurred every 30 seconds. He has previously experienced electric shocks. He does not use orthotics in his shoes and applies Band-Aids to his toes as larger bandages restrict circulation. He soaks his feet in salt water for 15 minutes on his days off. He reports no drainage from the surgical sites.     He has a history of diabetes, which he managed to reverse through dietary changes, but it has since recurred. He does not regularly monitor his blood sugar levels and is reluctant to take metformin. He believes his diabetes has worsened.    PAST SURGICAL HISTORY:  Surgery for " ingrown toenails on 04/22/2025.       Review of Systems   Constitutional:  Negative for fever.   Musculoskeletal:         +bilateral great toe pain       Medications:    Medications Ordered Prior to Encounter[1]     Allergies:   Aspirin    Problem List:   Problem List[2]     Surgical History:  Past Surgical History:   Procedure Laterality Date    APPENDECTOMY      HERNIA REPAIR Right     inguinal       Past Social Hx:   Social History[3]       Problem list, medications, and allergies reviewed by myself today in Epic.     Objective:     /88   Pulse (!) 119   Temp 36.6 °C (97.9 °F) (Temporal)   Resp 20   Wt 106 kg (233 lb)   SpO2 96%   BMI 33.43 kg/m²     Physical Exam  Vitals and nursing note reviewed.   Constitutional:       General: He is not in acute distress.     Appearance: Normal appearance. He is normal weight. He is not ill-appearing, toxic-appearing or diaphoretic.   HENT:      Head: Normocephalic and atraumatic.   Pulmonary:      Effort: Pulmonary effort is normal.   Musculoskeletal:        Feet:    Feet:      Comments: Healing post ingrown toenail procedure, mild erythema and swelling noted to nail folds, brisk cap refill  Skin:     General: Skin is warm and dry.      Capillary Refill: Capillary refill takes less than 2 seconds.   Neurological:      General: No focal deficit present.      Mental Status: He is alert and oriented to person, place, and time. Mental status is at baseline.      Gait: Gait normal.   Psychiatric:         Mood and Affect: Mood normal.         Behavior: Behavior normal.         Thought Content: Thought content normal.         Judgment: Judgment normal.         Assessment/Plan:     Diagnosis and associated orders:   1. Delayed wound healing  - cephALEXin (KEFLEX) 500 MG Cap; Take 1 Capsule by mouth 3 times a day for 5 days.  Dispense: 15 Capsule; Refill: 0  - Referral to establish with PCP    2. Type 2 diabetes mellitus without complication, without long-term current use  of insulin (HCC)  - Referral to establish with PCP        Comments/MDM:   Pt is clinically stable at today's acute urgent care visit.  No acute distress noted. Appropriate for outpatient management at this time.     Assessment & Plan    1. Delayed wound healing   The toenails appear to be healing slowly. Physical exam reveals soreness and redness in the affected area. A 5-day course of Keflex will be initiated to address any potential  infection. The patient was advised to elevate their feet, use anti-inflammatories such as ibuprofen for swelling and discomfort. Further recommended use of orthotics for foot pain.  Follow-up with the primary care physician is recommended for further management.    2. Diabetes mellitus.  The patient reports a history of diabetes, previously managed to reverse but has since returned. They are not currently checking blood sugars regularly. Blood sugar control is crucial to prevent complications such as peripheral neuropathy and delayed wound healing. The patient was advised to follow up with their primary care physician for an A1c test to assess blood sugar control over the past three months. Emphasis was placed on proper diabetes management to improve overall health and prevent further complications.            Discussed DDx, management options (risks,benefits, and alternatives to planned treatment), natural progression and supportive care.  Expressed understanding and the treatment plan was agreed upon. Questions were encouraged and answered   Return to urgent care prn if new or worsening sx or if there is no improvement in condition prn.    Educated in Red flags and indications to immediately call 911 or present to the Emergency Department.   Advised the patient to follow-up with the primary care physician for recheck, reevaluation, and consideration of further management.    I personally reviewed prior external notes and test results pertinent to today's visit.  I have independently  reviewed and interpreted all diagnostics ordered during this urgent care acute visit.       Please note that this dictation was created using voice recognition software. I have made a reasonable attempt to correct obvious errors, but I expect that there are errors of grammar and possibly content that I did not discover before finalizing the note.    This note was electronically signed by JOSE RAUL Huston         [1]   Current Outpatient Medications on File Prior to Visit   Medication Sig Dispense Refill    sumatriptan (IMITREX) 20 MG/ACT nasal spray Administer 1 Spray into affected nostril(S) as needed for Migraine (migraine). (Patient not taking: Reported on 5/19/2025) 1 Each 1    loperamide (IMODIUM) 2 MG Cap Take 1 Capsule by mouth 4 times a day as needed for Diarrhea. (Patient not taking: Reported on 5/19/2025) 30 Capsule 0    metFORMIN (GLUCOPHAGE) 500 MG Tab Take 500 mg by mouth 2 times a day with meals. (Patient not taking: Reported on 11/14/2024)       No current facility-administered medications on file prior to visit.   [2]   Patient Active Problem List  Diagnosis    Obesity (BMI 30-39.9)    Gastroesophageal reflux disease    Dyslipidemia    History of peptic ulcer    Dysphagia    Transaminitis    IFG (impaired fasting glucose)    Microalbuminuria    Type 2 diabetes mellitus without complication, without long-term current use of insulin (HCC)    Elevated blood pressure reading   [3]   Social History  Tobacco Use    Smoking status: Never    Smokeless tobacco: Never   Vaping Use    Vaping status: Never Used   Substance Use Topics    Alcohol use: Not Currently    Drug use: No

## 2025-05-22 ENCOUNTER — OFFICE VISIT (OUTPATIENT)
Dept: URGENT CARE | Facility: CLINIC | Age: 50
End: 2025-05-22
Payer: COMMERCIAL

## 2025-05-22 VITALS
HEIGHT: 70 IN | TEMPERATURE: 97.4 F | RESPIRATION RATE: 16 BRPM | DIASTOLIC BLOOD PRESSURE: 98 MMHG | WEIGHT: 233 LBS | HEART RATE: 108 BPM | BODY MASS INDEX: 33.36 KG/M2 | SYSTOLIC BLOOD PRESSURE: 142 MMHG | OXYGEN SATURATION: 93 %

## 2025-05-22 DIAGNOSIS — L08.9 TOE INFECTION: Primary | ICD-10-CM

## 2025-05-22 DIAGNOSIS — E11.628 TYPE 2 DIABETES MELLITUS WITH OTHER SKIN COMPLICATION, UNSPECIFIED WHETHER LONG TERM INSULIN USE (HCC): ICD-10-CM

## 2025-05-22 PROCEDURE — 3077F SYST BP >= 140 MM HG: CPT | Performed by: NURSE PRACTITIONER

## 2025-05-22 PROCEDURE — 3080F DIAST BP >= 90 MM HG: CPT | Performed by: NURSE PRACTITIONER

## 2025-05-22 PROCEDURE — 99214 OFFICE O/P EST MOD 30 MIN: CPT | Performed by: NURSE PRACTITIONER

## 2025-05-22 RX ORDER — SULFAMETHOXAZOLE AND TRIMETHOPRIM 800; 160 MG/1; MG/1
1 TABLET ORAL 2 TIMES DAILY
Qty: 14 TABLET | Refills: 0 | Status: SHIPPED | OUTPATIENT
Start: 2025-05-22 | End: 2025-05-29

## 2025-05-22 ASSESSMENT — ENCOUNTER SYMPTOMS
CHILLS: 0
FEVER: 0

## 2025-05-22 ASSESSMENT — FIBROSIS 4 INDEX: FIB4 SCORE: 1.85

## 2025-05-22 NOTE — LETTER
May 22, 2025         Patient: Teja Michel   YOB: 1975   Date of Visit: 5/22/2025           To Whom it May Concern:    Teja Michel was seen in my clinic on 5/22/2025. He may return to work on 5/26/25.    If you have any questions or concerns, please don't hesitate to call.        Sincerely,           DELGADO Arciniega.  Electronically Signed

## 2025-05-23 NOTE — PROGRESS NOTES
"Subjective:   Teja Michel is a 50 y.o. male who presents for Foot Problem (Feet pain x4 wks/Pt states he got surgery on toes and walks ackward)      Foot Problem  This is a recurrent problem. The current episode started 1 to 4 weeks ago (Patient recently had bilateral ingrown toenails cut out on great toes.  He is a type II diabetic has been on 2 rounds of Keflex.  Currently taking Keflex). The problem occurs constantly. The problem has been unchanged. Pertinent negatives include no chills or fever. Associated symptoms comments: Infection bilateral great toenails. The symptoms are aggravated by walking. Treatments tried: Currently taking Keflex. The treatment provided no relief.       Review of Systems   Constitutional:  Negative for chills and fever.   Musculoskeletal:  Negative for joint pain.   Skin:         Infection bilateral great toenails       Medications:    amoxicillin-clavulanate Tabs  cephALEXin Caps  loperamide Caps  metFORMIN Tabs  sulfamethoxazole-trimethoprim  sumatriptan    Allergies: Aspirin    Problem List: Teja Michel does not have any pertinent problems on file.    Surgical History:  Past Surgical History:   Procedure Laterality Date    APPENDECTOMY      HERNIA REPAIR Right     inguinal       Past Social Hx: Teja Michel  reports that he has never smoked. He has never used smokeless tobacco. He reports that he does not currently use alcohol. He reports that he does not use drugs.     Past Family Hx:  Teja Michel family history includes Heart Attack in his father; Heart Disease in his father; Hyperlipidemia in his father; Hypertension in his father; No Known Problems in his sister; Thyroid in his mother.     Problem list, medications, and allergies reviewed by myself today in Epic.     Objective:     BP (!) 142/98   Pulse (!) 108   Temp 36.3 °C (97.4 °F) (Temporal)   Resp 16   Ht 1.778 m (5' 10\")   Wt 106 kg (233 lb)   SpO2 " 93%   BMI 33.43 kg/m²     Physical Exam  Constitutional:       General: He is not in acute distress.     Appearance: Normal appearance. He is well-developed. He is not ill-appearing or toxic-appearing.   HENT:      Head: Normocephalic and atraumatic.      Right Ear: External ear normal.      Left Ear: External ear normal.      Nose: Nose normal.      Mouth/Throat:      Lips: Pink.   Eyes:      General: Lids are normal.      Conjunctiva/sclera: Conjunctivae normal.      Pupils: Pupils are equal, round, and reactive to light.   Cardiovascular:      Rate and Rhythm: Normal rate and regular rhythm.      Heart sounds: No murmur heard.  Pulmonary:      Effort: Pulmonary effort is normal. No accessory muscle usage or respiratory distress.      Breath sounds: Normal breath sounds.   Abdominal:      General: There is no distension.      Palpations: Abdomen is soft.      Tenderness: There is no abdominal tenderness.   Musculoskeletal:      Cervical back: Full passive range of motion without pain.   Feet:      Right foot:      Skin integrity: Erythema present.      Left foot:      Skin integrity: Erythema present.      Comments: Pus crusted fluid bilateral great toenail  Skin:     General: Skin is warm and dry.      Findings: Erythema present.   Neurological:      Mental Status: He is alert and oriented to person, place, and time.      Sensory: No sensory deficit.      Deep Tendon Reflexes: Reflexes are normal and symmetric.   Psychiatric:         Mood and Affect: Mood normal.         Thought Content: Thought content normal.          Assessment/Plan:     Diagnosis and associated orders:     1. Toe infection  amoxicillin-clavulanate (AUGMENTIN) 875-125 MG Tab    sulfamethoxazole-trimethoprim (BACTRIM DS) 800-160 MG tablet      2. Type 2 diabetes mellitus with other skin complication, unspecified whether long term insulin use (HCC)           Comments/MDM:     I personally reviewed prior external notes and prior test results  pertinent to today's visit.  Patient currently on Keflex bilateral great toenails do appear to be infected, patient is a type II diabetic.  Will have patient start new antibiotics with strict ER precautions  Discussed management options, risks and benefits, and alternatives to treatment plan agreed upon.   Red flags discussed and indications to immediately call 911 or present to the Emergency Department.   Supportive care, differential diagnoses, and indications for immediate follow-up discussed with patient.    Patient expresses understanding and agrees to plan. Patient denies any other questions or concerns.                Please note that this dictation was created using voice recognition software. I have made a reasonable attempt to correct obvious errors, but I expect that there are errors of grammar and possibly content that I did not discover before finalizing the note.    This note was electronically signed by Johny BLUNT.

## 2025-05-26 ENCOUNTER — OFFICE VISIT (OUTPATIENT)
Dept: URGENT CARE | Facility: CLINIC | Age: 50
End: 2025-05-26
Payer: COMMERCIAL

## 2025-05-26 VITALS
HEIGHT: 70 IN | BODY MASS INDEX: 34.01 KG/M2 | DIASTOLIC BLOOD PRESSURE: 80 MMHG | HEART RATE: 105 BPM | SYSTOLIC BLOOD PRESSURE: 136 MMHG | RESPIRATION RATE: 20 BRPM | WEIGHT: 237.6 LBS | OXYGEN SATURATION: 96 % | TEMPERATURE: 97 F

## 2025-05-26 DIAGNOSIS — M79.674 PAIN IN TOES OF BOTH FEET: Primary | ICD-10-CM

## 2025-05-26 DIAGNOSIS — E11.628 TYPE 2 DIABETES MELLITUS WITH OTHER SKIN COMPLICATION, UNSPECIFIED WHETHER LONG TERM INSULIN USE (HCC): ICD-10-CM

## 2025-05-26 DIAGNOSIS — M79.675 PAIN IN TOES OF BOTH FEET: Primary | ICD-10-CM

## 2025-05-26 DIAGNOSIS — T14.8XXD DELAYED WOUND HEALING: ICD-10-CM

## 2025-05-26 PROCEDURE — 3075F SYST BP GE 130 - 139MM HG: CPT

## 2025-05-26 PROCEDURE — 3079F DIAST BP 80-89 MM HG: CPT

## 2025-05-26 PROCEDURE — 99214 OFFICE O/P EST MOD 30 MIN: CPT

## 2025-05-26 ASSESSMENT — FIBROSIS 4 INDEX: FIB4 SCORE: 1.85

## 2025-05-26 NOTE — LETTER
May 26, 2025    To Whom It May Concern:         This is confirmation that Teja Michel attended his scheduled appointment with PAT Balderrama on 5/26/25.  May return on 5/29         If you have any questions please do not hesitate to call me at the phone number listed below.    Sincerely,          DELGADO Balderrama.  [phone]

## 2025-05-26 NOTE — PROGRESS NOTES
"Chief Complaint   Patient presents with    Wound Check     Patient is here today for wound check on his toes. Patient had ingrown toe nails removed and it is not healing         Subjective:   HISTORY OF PRESENT ILLNESS: Teja Michel is a 50 y.o. male who presents for pain to his toes.  He has been seen multiple times.  The last being  4 days ago where he received 2 different antibiotics for infection.  He states the redness ahs improved but over his pain continues.  Patient denies fever or chills    Medications, Allergies, current problem list, Social and Family history reviewed today in Epic.     Objective:     /80   Pulse (!) 105   Temp 36.1 °C (97 °F) (Temporal)   Resp 20   Ht 1.778 m (5' 10\")   Wt 108 kg (237 lb 9.6 oz)   SpO2 96%     Physical Exam  Vitals reviewed.   Constitutional:       Appearance: Normal appearance.   HENT:      Mouth/Throat:      Mouth: Mucous membranes are moist.   Cardiovascular:      Rate and Rhythm: Normal rate.   Pulmonary:      Effort: Pulmonary effort is normal.   Feet:      Comments: He has scabs noted to bilateral proximal nail folds with mild erythema surrounding. No purulence or abscess noted  Skin:     General: Skin is warm and dry.   Neurological:      Mental Status: He is alert and oriented to person, place, and time.   Psychiatric:         Mood and Affect: Mood normal.          Assessment/Plan:     Diagnosis and associated orders    I personally reviewed prior external notes and test results pertinent to today's visit.     1. Pain in toes of both feet        2. Delayed wound healing        3. Type 2 diabetes mellitus with other skin complication, unspecified whether long term insulin use (HCC)              IMPRESSION:  Pt presents with persistent toe pain following ingrown toe nail surgery and delayed healing.  He is currently on dual antibiotics therapy and reports overall improvement.  Basically pt's pain is preventing him from wearing his steel " toe shoes at work and he is requesting time off to continue healing.  Informed him I can provide 3 days but he will ultimately need to see his PCP or specialist for short term disability.  Pt is declining to take any tylenol or ibuprofen for pain relief on an acute basis. He does have an appointment with primary care on June 4th.  He states he will probably need to come back here for additional work notes until he can be seen.       Patient states understanding of the plan of care and discharge instructions.  They are discharged in stable condition.     My total time spent caring for the patient on the day of the encounter was 30 minutes.   This does not include time spent on separately billable procedures/tests.       Please note that this dictation was created using voice recognition software. I have made a reasonable attempt to correct obvious errors, but I expect that there are errors of grammar and possibly content that I did not discover before finalizing the note.    This note was electronically signed by PAT Balderrama

## 2025-06-02 ENCOUNTER — OFFICE VISIT (OUTPATIENT)
Dept: URGENT CARE | Facility: CLINIC | Age: 50
End: 2025-06-02
Payer: COMMERCIAL

## 2025-06-02 VITALS
BODY MASS INDEX: 33.93 KG/M2 | RESPIRATION RATE: 16 BRPM | HEIGHT: 70 IN | DIASTOLIC BLOOD PRESSURE: 68 MMHG | WEIGHT: 237 LBS | SYSTOLIC BLOOD PRESSURE: 130 MMHG | HEART RATE: 103 BPM | OXYGEN SATURATION: 95 % | TEMPERATURE: 97.6 F

## 2025-06-02 DIAGNOSIS — L08.9 TOE INFECTION: Primary | ICD-10-CM

## 2025-06-02 PROCEDURE — 3075F SYST BP GE 130 - 139MM HG: CPT

## 2025-06-02 PROCEDURE — 3078F DIAST BP <80 MM HG: CPT

## 2025-06-02 PROCEDURE — 99214 OFFICE O/P EST MOD 30 MIN: CPT

## 2025-06-02 RX ORDER — DOXYCYCLINE 100 MG/1
100 CAPSULE ORAL 2 TIMES DAILY
Qty: 14 CAPSULE | Refills: 0 | Status: SHIPPED | OUTPATIENT
Start: 2025-06-02 | End: 2025-06-09

## 2025-06-02 ASSESSMENT — FIBROSIS 4 INDEX: FIB4 SCORE: 1.85

## 2025-06-02 NOTE — PROGRESS NOTES
Subjective:   Teja Michel is a 50 y.o. male who presents for Toe Pain (Had surgery over a month ago and LT toe is not healing )      Wound Infection  This is a chronic problem. The current episode started more than 1 month ago. The problem has been unchanged. Pertinent negatives include no abdominal pain, chest pain, chills, congestion, coughing, fever, headaches, myalgias, nausea, rash, sore throat or vomiting. Associated symptoms comments: Left toe. Exacerbated by: Patient reports that he is required to wear steel toe boots at work that applied pressure to area and worsening symptoms. Treatments tried: Recent course of Augmentin and Bactrim from previous visit. The treatment provided moderate relief.       Review of Systems   Constitutional:  Negative for chills, fever and malaise/fatigue.   HENT:  Negative for congestion, ear pain, hearing loss and sore throat.    Respiratory:  Negative for cough and shortness of breath.    Cardiovascular:  Negative for chest pain.   Gastrointestinal:  Negative for abdominal pain, diarrhea, nausea and vomiting.   Genitourinary:  Negative for dysuria.   Musculoskeletal:  Negative for myalgias.   Skin:  Negative for rash.        Recurrent redness and swelling to left toe   Neurological:  Negative for headaches.       Allergies[1]    Patient Active Problem List    Diagnosis Date Noted    Toe infection 06/04/2025    Type 2 diabetes mellitus without complication, without long-term current use of insulin (HCC) 08/09/2024    Primary hypertension 08/09/2024    Transaminitis 11/02/2020    IFG (impaired fasting glucose) 11/02/2020    Microalbuminuria 11/02/2020    Obesity (BMI 30-39.9) 10/14/2020    Gastroesophageal reflux disease 10/14/2020    Dyslipidemia 10/14/2020    History of peptic ulcer 10/14/2020    Dysphagia 10/14/2020       Current Medications and Prescriptions Ordered in Epic[2]    Past Surgical History:   Procedure Laterality Date    APPENDECTOMY      HERNIA  "REPAIR Right     inguinal       Social History[3]    family history includes Heart Attack in his father; Heart Disease in his father; Hyperlipidemia in his father; Hypertension in his father; No Known Problems in his sister; Thyroid in his mother.     Problem list, medications, and allergies reviewed by myself today in Epic.     Objective:   /68 (BP Location: Left arm, Patient Position: Sitting, BP Cuff Size: Large adult)   Pulse (!) 103   Temp 36.4 °C (97.6 °F) (Temporal)   Resp 16   Ht 1.778 m (5' 10\")   Wt 108 kg (237 lb)   SpO2 95%   BMI 34.01 kg/m²     Physical Exam  Vitals and nursing note reviewed.   Constitutional:       General: He is not in acute distress.     Appearance: Normal appearance. He is not ill-appearing.   HENT:      Head: Normocephalic and atraumatic.      Right Ear: Tympanic membrane normal.      Left Ear: Tympanic membrane normal.      Nose: Nose normal.      Mouth/Throat:      Mouth: Mucous membranes are moist.      Pharynx: Oropharynx is clear.   Eyes:      Conjunctiva/sclera: Conjunctivae normal.      Pupils: Pupils are equal, round, and reactive to light.   Cardiovascular:      Rate and Rhythm: Normal rate.      Heart sounds: Normal heart sounds.   Pulmonary:      Effort: Pulmonary effort is normal.      Breath sounds: Normal breath sounds.   Abdominal:      General: Abdomen is flat.      Palpations: Abdomen is soft.   Musculoskeletal:      Left foot: Decreased range of motion. No deformity.        Feet:    Feet:      Left foot:      Skin integrity: Erythema and warmth present. No skin breakdown.   Skin:     General: Skin is warm and dry.      Capillary Refill: Capillary refill takes less than 2 seconds.      Findings: Erythema present. No abscess.   Neurological:      Mental Status: He is alert and oriented to person, place, and time.   Psychiatric:         Mood and Affect: Mood normal.         Behavior: Behavior normal.         Assessment/Plan:     1. Toe infection  " doxycycline (MONODOX) 100 MG capsule        After assessment patient symptoms are concerning for recurrent infection after recently being seen for similar in our urgent care.  Previous provider notes were reviewed at this time.  Patient was previously placed on Augmentin and Bactrim.  Patient reports that symptoms did improve but since he has gone back to work and reports wearing required boot causes worsening pain.  Patient reports that he did follow-up with podiatry after surgery and states that he did not seem to take symptoms seriously and offered him different options and boot which patient has already tried.  Patient does not have follow-up with his primary care this month.  At this time I did provide patient a course of doxycycline and instructed him to keep appointment with primary care for further management of issues and possible referral to different podiatrist.  Patient instructed to monitor for any worsening signs or symptoms of any other concerns patient was instructed return to urgent care for reevaluation.    Differential diagnosis, natural history, and supportive care discussed. We also reviewed side effects of medication including allergic response, GI upset, tendon injury, rash, sedation etc. Patient and/or guardian voices understanding.      Advised the patient to follow-up with the primary care physician for recheck, reevaluation, and consideration of further management.    Please note that this dictation was created using voice recognition software. I have made every reasonable attempt to correct obvious errors, but I expect that there are errors of grammar and possibly content that I did not discover before finalizing the note.    This note was electronically signed by PAT Granados          [1]   Allergies  Allergen Reactions    Aspirin Swelling     Facial swelling   [2]   Current Outpatient Medications Ordered in Epic   Medication Sig Dispense Refill    doxycycline (MONODOX) 100 MG  capsule Take 1 Capsule by mouth 2 times a day for 7 days. 14 Capsule 0     No current Epic-ordered facility-administered medications on file.   [3]   Social History  Tobacco Use    Smoking status: Never    Smokeless tobacco: Never   Vaping Use    Vaping status: Never Used   Substance Use Topics    Alcohol use: Not Currently    Drug use: No

## 2025-06-02 NOTE — LETTER
[facility]  [address] [id number]     June 2, 2025    Patient: Teja Michel   YOB: 1975   Date of Visit: 6/2/2025       To Whom It May Concern:    Teja Michel was seen and treated in our department on 6/2/2025. Please excuse Patient from work Today thru Wednesday due to recent illness.     Sincerely,     DELGADO Gregory.

## 2025-06-04 ENCOUNTER — APPOINTMENT (OUTPATIENT)
Dept: MEDICAL GROUP | Facility: CLINIC | Age: 50
End: 2025-06-04
Payer: COMMERCIAL

## 2025-06-04 VITALS
TEMPERATURE: 98 F | HEART RATE: 95 BPM | SYSTOLIC BLOOD PRESSURE: 154 MMHG | DIASTOLIC BLOOD PRESSURE: 92 MMHG | OXYGEN SATURATION: 94 % | BODY MASS INDEX: 30.06 KG/M2 | HEIGHT: 70 IN | WEIGHT: 210 LBS

## 2025-06-04 DIAGNOSIS — R03.0 ELEVATED BLOOD PRESSURE READING: ICD-10-CM

## 2025-06-04 DIAGNOSIS — E11.9 TYPE 2 DIABETES MELLITUS WITHOUT COMPLICATION, WITHOUT LONG-TERM CURRENT USE OF INSULIN (HCC): ICD-10-CM

## 2025-06-04 DIAGNOSIS — I10 PRIMARY HYPERTENSION: ICD-10-CM

## 2025-06-04 DIAGNOSIS — E78.5 DYSLIPIDEMIA: ICD-10-CM

## 2025-06-04 DIAGNOSIS — L08.9 TOE INFECTION: Primary | ICD-10-CM

## 2025-06-04 DIAGNOSIS — R74.01 TRANSAMINITIS: ICD-10-CM

## 2025-06-04 LAB
HBA1C MFR BLD: 6.3 % (ref ?–5.8)
POCT INT CON NEG: NEGATIVE
POCT INT CON POS: POSITIVE

## 2025-06-04 PROCEDURE — 99214 OFFICE O/P EST MOD 30 MIN: CPT | Mod: GC

## 2025-06-04 PROCEDURE — 83036 HEMOGLOBIN GLYCOSYLATED A1C: CPT | Mod: GC

## 2025-06-04 PROCEDURE — 3080F DIAST BP >= 90 MM HG: CPT | Mod: GC

## 2025-06-04 PROCEDURE — 3077F SYST BP >= 140 MM HG: CPT | Mod: GC

## 2025-06-04 ASSESSMENT — FIBROSIS 4 INDEX: FIB4 SCORE: 1.85

## 2025-06-04 NOTE — ASSESSMENT & PLAN NOTE
Recommend blood pressure log. Check BP at least twice weakly  -declines BP agent at this time.  -BP log

## 2025-06-04 NOTE — ASSESSMENT & PLAN NOTE
Diabetes currently well controlled. A1c was 9.8 in July 2024, now 6.3 in clinic today. Monofilament done today with mildly decreased sensation on the right. Also having symptoms of diabetic neuropathy.  -Continue dietary changes  -Repeat A1c in three months  -declines starting gabapentin for neuropathy

## 2025-06-04 NOTE — Clinical Note
Would you please ask the patient if he would like to do HNP and DOVER.  You reached out 8/2024 and I cannot tell if the HNP and ELF labs have been ordered.

## 2025-06-04 NOTE — LETTER
June 13, 2025    To Whom It May Concern:         This is confirmation that Teja Michel attended his scheduled appointment with Omar Weiss M.D. on 6/04/25. He is cleared to return to work with some light duty restrictions to allow his feet to completely heal. Please restrict his walking to no longer than 20 minutes at a time with at least 5 minutes of rest in between. He should only require this restriction for 1 week, after which he may return to regular work duties.         If you have any questions please do not hesitate to call me at the phone number listed below.    Sincerely,          Omar Weiss M.D.  [phone]

## 2025-06-04 NOTE — ASSESSMENT & PLAN NOTE
Toes healing well. Recommend return to work by Monday with some temporary walking restrictions while wearing his work shoes. Recommend switching shoes to relieve pressure on toes.  -complete antibiotics.   -FMLA paperwork completed for time missed

## 2025-06-04 NOTE — LETTER
[facility]     June 4, 2025    Patient: Teja Michel   YOB: 1975   Date of Visit: 6/4/2025       To Whom It May Concern:    Teja Michel was seen and treated in our department on 6/4/2025. Please excuse him from work for this day for receiving care at out facility.    Sincerely,     Omar Weiss M.D.

## 2025-06-04 NOTE — PROGRESS NOTES
HPI:  Patient is a 50 y.o. male here for   Problem   Toe Infection    Patient reports several weeks of bilateral big toe infections and prolonged healing after ingrown toe nail extraction. He has multiple urgent care visits for this and is currently on his fourth round of antibiotics. Was on keflex, bactrim, and then augmentin. Now on doxycycline. Reports adherence. Has missed a lot of work for this and secondary to pain caused by his shoes putting pressure over the toe wounds while he walks. Since this most recent round of antibiotics, he reports marked improvement and feels almost ready to return to work. Requests [facility] paperwork for this time missed.     Type 2 Diabetes Mellitus Without Complication, Without Long-Term Current Use of Insulin ([facility])    States since last visit he was able to reverse his diabetes through dietary changes and taking dyhydroberberine supplement. Has not been taking his metformin for a long time. Also discontinued supplement. Has recently not been adherent with his diet and feels like his diabetes has gotten worse again. Has had numbness/tingling in feet and delayed healing from recent  ingrown toe nail removal.     Primary Hypertension    Several elevated blood pressure readings from past clinic visits over the past few months. Explained this is diagnostic of hypertension and recommend starting a medication to reduce blood pressure. Patient declines at this time and opts to try dietary changes.     Transaminitis    8/9/25: Has a history of previously elevated liver function enzymes since 2020.  Has never been diagnosed with hep C or hep B.  Was tested last in 2020.  Liver function enzymes remain elevated on labs from his ER visit.  Denies current alcohol use.  Does state he used to party a long time ago but denies ever having a history of heavy alcohol use or alcohol dependence.  Denies any recreational drug use.    6/4/25: advised patient to get labs from last visit. Recommend healthy  "[employer] research study.                                                                                                                                    Patient Active Problem List    Diagnosis Date Noted    Toe infection 06/04/2025    Type 2 diabetes mellitus without complication, without long-term current use of insulin (HCC) 08/09/2024    Primary hypertension 08/09/2024    Transaminitis 11/02/2020    IFG (impaired fasting glucose) 11/02/2020    Microalbuminuria 11/02/2020    Obesity (BMI 30-39.9) 10/14/2020    Gastroesophageal reflux disease 10/14/2020    Dyslipidemia 10/14/2020    History of peptic ulcer 10/14/2020    Dysphagia 10/14/2020       Current Medications[1]      Allergies as of 06/04/2025 - Reviewed 06/02/2025   Allergen Reaction Noted    Aspirin Swelling 06/25/2019          BP (!) 154/92 (BP Location: Right arm, Patient Position: Sitting, BP Cuff Size: Large adult)   Pulse 95   Temp 36.7 °C (98 °F)   Ht 1.778 m (5' 10\")   Wt 95.3 kg (210 lb)   SpO2 94%   BMI 30.13 kg/m²     Gen: no fevers/chills, no changes in weight  Eyes: no changes in vision  ENT: no sore throat, no hearing loss, no bloody nose  Pulm: no sob, no cough  CV: no chest pain, no palpitations  GI: no nausea/vomiting, no diarrhea  : no dysuria or flank pain  MSk: no myalgias  Skin: no rash  Psych: no change in mood   Neuro: no headaches, no numbness/tingling  Heme/Lymph: no easy bruising or bleeding    Physical Exam:  Gen:         Alert and oriented, No apparent distress.  Neck:        No Lymphadenopathy or Bruits.  Lungs:     Clear to auscultation bilaterally  CV:           Regular rate and rhythm. No murmurs, rubs or gallops.    Abdomen: soft, nontender, nondistended.    Skin:      no rash or exudate             Ext:          No clubbing, cyanosis, edema.  Feet:  Bilateral greater toes mildly erythematous but healing well.    Monofilament testing with a 10 gram force: sensation intact: decreased on right  Visual " Inspection: Feet without maceration, ulcers, fissures.  Pedal pulses: intact bilaterally       Assessment and Plan    50 y.o. male with the following-  Problem List Items Addressed This Visit          Family Medicine Problems    Primary hypertension    Recommend blood pressure log. Check BP at least twice weakly  -declines BP agent at this time.  -BP log         Relevant Orders    Comp Metabolic Panel       Other    Dyslipidemia    Relevant Orders    Lipid Profile    Transaminitis    Referral for [facility] sent.         Relevant Orders    Comp Metabolic Panel    Type 2 diabetes mellitus without complication, without long-term current use of insulin ([facility])    Diabetes currently well controlled. A1c was 9.8 in July 2024, now 6.3 in clinic today. Monofilament done today with mildly decreased sensation on the right. Also having symptoms of diabetic neuropathy.  -Continue dietary changes  -Repeat A1c in three months  -declines starting gabapentin for neuropathy         Relevant Orders    POCT A1C (Completed)    Comp Metabolic Panel    Microalbumin Creat Ratio Urine (Lab Collect)    Toe infection - Primary    Toes healing well. Recommend return to work by Monday with some temporary walking restrictions while wearing his work shoes. Recommend switching shoes to relieve pressure on toes.  -complete antibiotics.   -FMLA paperwork completed for time missed          Return in about 4 weeks (around 7/2/2025).      This chart was either fully or partly dictated using an electronic voice recognition software. The chart has been reviewed and edited but there is still possibility for dictation errors due to limitation of software     Omar Weiss MD, PGY1         [1]   Current Outpatient Medications   Medication Sig Dispense Refill    doxycycline (MONODOX) 100 MG capsule Take 1 Capsule by mouth 2 times a day for 7 days. 14 Capsule 0     No current facility-administered medications for this visit.

## 2025-06-05 ASSESSMENT — ENCOUNTER SYMPTOMS
HEADACHES: 0
DIARRHEA: 0
CHILLS: 0
MYALGIAS: 0
FEVER: 0
SHORTNESS OF BREATH: 0
VOMITING: 0
COUGH: 0
ABDOMINAL PAIN: 0
NAUSEA: 0
SORE THROAT: 0

## 2025-08-22 DIAGNOSIS — Z00.6 CLINICAL TRIAL PARTICIPANT: ICD-10-CM
